# Patient Record
Sex: MALE | Race: WHITE | NOT HISPANIC OR LATINO | Employment: OTHER | URBAN - METROPOLITAN AREA
[De-identification: names, ages, dates, MRNs, and addresses within clinical notes are randomized per-mention and may not be internally consistent; named-entity substitution may affect disease eponyms.]

---

## 2019-10-15 ENCOUNTER — HOSPITAL ENCOUNTER (OUTPATIENT)
Dept: LAB | Facility: MEDICAL CENTER | Age: 74
End: 2019-10-15
Attending: FAMILY MEDICINE
Payer: MEDICARE

## 2024-05-20 ENCOUNTER — HOSPITAL ENCOUNTER (OUTPATIENT)
Dept: RADIOLOGY | Facility: MEDICAL CENTER | Age: 79
End: 2024-05-20
Payer: COMMERCIAL

## 2024-05-20 ENCOUNTER — HOSPITAL ENCOUNTER (INPATIENT)
Facility: MEDICAL CENTER | Age: 79
LOS: 5 days | DRG: 393 | End: 2024-05-25
Attending: HOSPITALIST | Admitting: HOSPITALIST
Payer: COMMERCIAL

## 2024-05-20 DIAGNOSIS — M25.50 ARTHRALGIA OF MULTIPLE JOINTS: ICD-10-CM

## 2024-05-20 DIAGNOSIS — K55.9 ISCHEMIC BOWEL DISEASE (HCC): Primary | ICD-10-CM

## 2024-05-20 DIAGNOSIS — M19.90 ARTHRITIS: ICD-10-CM

## 2024-05-20 DIAGNOSIS — I71.40 ABDOMINAL AORTIC ANEURYSM (AAA) WITHOUT RUPTURE, UNSPECIFIED PART (HCC): ICD-10-CM

## 2024-05-20 PROBLEM — Z71.89 ADVANCED CARE PLANNING/COUNSELING DISCUSSION: Status: ACTIVE | Noted: 2024-05-20

## 2024-05-20 PROBLEM — E43 SEVERE PROTEIN-CALORIE MALNUTRITION (HCC): Status: ACTIVE | Noted: 2024-05-20

## 2024-05-20 PROBLEM — N28.0 RENAL INFARCT (HCC): Status: ACTIVE | Noted: 2024-05-20

## 2024-05-20 PROBLEM — K92.2 GI BLEED: Status: ACTIVE | Noted: 2024-05-20

## 2024-05-20 LAB
ALBUMIN SERPL BCP-MCNC: 3.7 G/DL (ref 3.2–4.9)
ALBUMIN/GLOB SERPL: 1.4 G/DL
ALP SERPL-CCNC: 73 U/L (ref 30–99)
ALT SERPL-CCNC: 9 U/L (ref 2–50)
ANION GAP SERPL CALC-SCNC: 10 MMOL/L (ref 7–16)
AST SERPL-CCNC: 15 U/L (ref 12–45)
BASOPHILS # BLD AUTO: 0.6 % (ref 0–1.8)
BASOPHILS # BLD: 0.08 K/UL (ref 0–0.12)
BILIRUB SERPL-MCNC: 0.7 MG/DL (ref 0.1–1.5)
BUN SERPL-MCNC: 16 MG/DL (ref 8–22)
CALCIUM ALBUM COR SERPL-MCNC: 8.5 MG/DL (ref 8.5–10.5)
CALCIUM SERPL-MCNC: 8.3 MG/DL (ref 8.5–10.5)
CHLORIDE SERPL-SCNC: 109 MMOL/L (ref 96–112)
CO2 SERPL-SCNC: 23 MMOL/L (ref 20–33)
CREAT SERPL-MCNC: 0.87 MG/DL (ref 0.5–1.4)
EOSINOPHIL # BLD AUTO: 0.18 K/UL (ref 0–0.51)
EOSINOPHIL NFR BLD: 1.3 % (ref 0–6.9)
ERYTHROCYTE [DISTWIDTH] IN BLOOD BY AUTOMATED COUNT: 45.5 FL (ref 35.9–50)
EXTRA TUBE BLU BLU: NORMAL
GFR SERPLBLD CREATININE-BSD FMLA CKD-EPI: 88 ML/MIN/1.73 M 2
GLOBULIN SER CALC-MCNC: 2.7 G/DL (ref 1.9–3.5)
GLUCOSE SERPL-MCNC: 87 MG/DL (ref 65–99)
HCT VFR BLD AUTO: 38.2 % (ref 42–52)
HGB BLD-MCNC: 12.9 G/DL (ref 14–18)
IMM GRANULOCYTES # BLD AUTO: 0.05 K/UL (ref 0–0.11)
IMM GRANULOCYTES NFR BLD AUTO: 0.3 % (ref 0–0.9)
LACTATE SERPL-SCNC: 1 MMOL/L (ref 0.5–2)
LYMPHOCYTES # BLD AUTO: 2.11 K/UL (ref 1–4.8)
LYMPHOCYTES NFR BLD: 14.7 % (ref 22–41)
MAGNESIUM SERPL-MCNC: 2 MG/DL (ref 1.5–2.5)
MCH RBC QN AUTO: 31.8 PG (ref 27–33)
MCHC RBC AUTO-ENTMCNC: 33.8 G/DL (ref 32.3–36.5)
MCV RBC AUTO: 94.1 FL (ref 81.4–97.8)
MONOCYTES # BLD AUTO: 1.28 K/UL (ref 0–0.85)
MONOCYTES NFR BLD AUTO: 8.9 % (ref 0–13.4)
NEUTROPHILS # BLD AUTO: 10.63 K/UL (ref 1.82–7.42)
NEUTROPHILS NFR BLD: 74.2 % (ref 44–72)
NRBC # BLD AUTO: 0 K/UL
NRBC BLD-RTO: 0 /100 WBC (ref 0–0.2)
PHOSPHATE SERPL-MCNC: 2.5 MG/DL (ref 2.5–4.5)
PLATELET # BLD AUTO: 198 K/UL (ref 164–446)
PMV BLD AUTO: 11.3 FL (ref 9–12.9)
POTASSIUM SERPL-SCNC: 3.8 MMOL/L (ref 3.6–5.5)
PROT SERPL-MCNC: 6.4 G/DL (ref 6–8.2)
RBC # BLD AUTO: 4.06 M/UL (ref 4.7–6.1)
SODIUM SERPL-SCNC: 142 MMOL/L (ref 135–145)
WBC # BLD AUTO: 14.3 K/UL (ref 4.8–10.8)

## 2024-05-20 PROCEDURE — C9113 INJ PANTOPRAZOLE SODIUM, VIA: HCPCS | Performed by: STUDENT IN AN ORGANIZED HEALTH CARE EDUCATION/TRAINING PROGRAM

## 2024-05-20 PROCEDURE — 700105 HCHG RX REV CODE 258: Performed by: STUDENT IN AN ORGANIZED HEALTH CARE EDUCATION/TRAINING PROGRAM

## 2024-05-20 PROCEDURE — 80053 COMPREHEN METABOLIC PANEL: CPT

## 2024-05-20 PROCEDURE — 84100 ASSAY OF PHOSPHORUS: CPT

## 2024-05-20 PROCEDURE — 36415 COLL VENOUS BLD VENIPUNCTURE: CPT

## 2024-05-20 PROCEDURE — 85025 COMPLETE CBC W/AUTO DIFF WBC: CPT

## 2024-05-20 PROCEDURE — 83605 ASSAY OF LACTIC ACID: CPT

## 2024-05-20 PROCEDURE — 83735 ASSAY OF MAGNESIUM: CPT

## 2024-05-20 PROCEDURE — 99497 ADVNCD CARE PLAN 30 MIN: CPT | Mod: 25 | Performed by: STUDENT IN AN ORGANIZED HEALTH CARE EDUCATION/TRAINING PROGRAM

## 2024-05-20 PROCEDURE — 770020 HCHG ROOM/CARE - TELE (206)

## 2024-05-20 PROCEDURE — 87040 BLOOD CULTURE FOR BACTERIA: CPT | Mod: 91

## 2024-05-20 PROCEDURE — 700111 HCHG RX REV CODE 636 W/ 250 OVERRIDE (IP): Performed by: STUDENT IN AN ORGANIZED HEALTH CARE EDUCATION/TRAINING PROGRAM

## 2024-05-20 PROCEDURE — 99406 BEHAV CHNG SMOKING 3-10 MIN: CPT | Performed by: STUDENT IN AN ORGANIZED HEALTH CARE EDUCATION/TRAINING PROGRAM

## 2024-05-20 PROCEDURE — 99221 1ST HOSP IP/OBS SF/LOW 40: CPT | Performed by: SURGERY

## 2024-05-20 PROCEDURE — 99223 1ST HOSP IP/OBS HIGH 75: CPT | Mod: 25 | Performed by: STUDENT IN AN ORGANIZED HEALTH CARE EDUCATION/TRAINING PROGRAM

## 2024-05-20 RX ORDER — PANTOPRAZOLE SODIUM 40 MG/10ML
40 INJECTION, POWDER, LYOPHILIZED, FOR SOLUTION INTRAVENOUS 2 TIMES DAILY
Status: DISCONTINUED | OUTPATIENT
Start: 2024-05-20 | End: 2024-05-25

## 2024-05-20 RX ORDER — SODIUM CHLORIDE 9 MG/ML
INJECTION, SOLUTION INTRAVENOUS CONTINUOUS
Status: ACTIVE | OUTPATIENT
Start: 2024-05-20 | End: 2024-05-21

## 2024-05-20 RX ORDER — ACETAMINOPHEN 325 MG/1
650 TABLET ORAL EVERY 6 HOURS PRN
Status: DISCONTINUED | OUTPATIENT
Start: 2024-05-20 | End: 2024-05-25 | Stop reason: HOSPADM

## 2024-05-20 RX ADMIN — SODIUM CHLORIDE: 9 INJECTION, SOLUTION INTRAVENOUS at 22:22

## 2024-05-20 RX ADMIN — PIPERACILLIN AND TAZOBACTAM 4.5 G: 4; .5 INJECTION, POWDER, FOR SOLUTION INTRAVENOUS at 22:29

## 2024-05-20 RX ADMIN — PANTOPRAZOLE SODIUM 40 MG: 40 INJECTION, POWDER, FOR SOLUTION INTRAVENOUS at 22:10

## 2024-05-20 SDOH — ECONOMIC STABILITY: TRANSPORTATION INSECURITY
IN THE PAST 12 MONTHS, HAS LACK OF RELIABLE TRANSPORTATION KEPT YOU FROM MEDICAL APPOINTMENTS, MEETINGS, WORK OR FROM GETTING THINGS NEEDED FOR DAILY LIVING?: NO

## 2024-05-20 SDOH — ECONOMIC STABILITY: TRANSPORTATION INSECURITY
IN THE PAST 12 MONTHS, HAS THE LACK OF TRANSPORTATION KEPT YOU FROM MEDICAL APPOINTMENTS OR FROM GETTING MEDICATIONS?: NO

## 2024-05-20 ASSESSMENT — ENCOUNTER SYMPTOMS
HEMOPTYSIS: 0
ORTHOPNEA: 0
NECK PAIN: 0
SHORTNESS OF BREATH: 0
DIZZINESS: 0
CONSTIPATION: 0
VOMITING: 0
BACK PAIN: 0
PALPITATIONS: 0
HEARTBURN: 0
DIARRHEA: 0
SINUS PAIN: 0
DOUBLE VISION: 0
BLOOD IN STOOL: 1
STRIDOR: 0
COUGH: 0
FEVER: 0
NERVOUS/ANXIOUS: 0
SPUTUM PRODUCTION: 0
PHOTOPHOBIA: 0
EYE PAIN: 0
HEADACHES: 0
EYE DISCHARGE: 0
MYALGIAS: 0
HALLUCINATIONS: 0
NAUSEA: 0
BLURRED VISION: 0
CHILLS: 0
ABDOMINAL PAIN: 0

## 2024-05-20 ASSESSMENT — COGNITIVE AND FUNCTIONAL STATUS - GENERAL
DAILY ACTIVITIY SCORE: 24
CLIMB 3 TO 5 STEPS WITH RAILING: A LITTLE
STANDING UP FROM CHAIR USING ARMS: A LITTLE
MOBILITY SCORE: 22
SUGGESTED CMS G CODE MODIFIER DAILY ACTIVITY: CH
SUGGESTED CMS G CODE MODIFIER MOBILITY: CJ

## 2024-05-20 ASSESSMENT — SOCIAL DETERMINANTS OF HEALTH (SDOH)
WITHIN THE PAST 12 MONTHS, THE FOOD YOU BOUGHT JUST DIDN'T LAST AND YOU DIDN'T HAVE MONEY TO GET MORE: NEVER TRUE
WITHIN THE LAST YEAR, HAVE TO BEEN RAPED OR FORCED TO HAVE ANY KIND OF SEXUAL ACTIVITY BY YOUR PARTNER OR EX-PARTNER?: NO
WITHIN THE LAST YEAR, HAVE YOU BEEN AFRAID OF YOUR PARTNER OR EX-PARTNER?: NO
WITHIN THE LAST YEAR, HAVE YOU BEEN HUMILIATED OR EMOTIONALLY ABUSED IN OTHER WAYS BY YOUR PARTNER OR EX-PARTNER?: NO
WITHIN THE LAST YEAR, HAVE YOU BEEN KICKED, HIT, SLAPPED, OR OTHERWISE PHYSICALLY HURT BY YOUR PARTNER OR EX-PARTNER?: NO
IN THE PAST 12 MONTHS, HAS THE ELECTRIC, GAS, OIL, OR WATER COMPANY THREATENED TO SHUT OFF SERVICE IN YOUR HOME?: NO
WITHIN THE PAST 12 MONTHS, YOU WORRIED THAT YOUR FOOD WOULD RUN OUT BEFORE YOU GOT THE MONEY TO BUY MORE: NEVER TRUE

## 2024-05-20 ASSESSMENT — PATIENT HEALTH QUESTIONNAIRE - PHQ9
7. TROUBLE CONCENTRATING ON THINGS, SUCH AS READING THE NEWSPAPER OR WATCHING TELEVISION: NOT AT ALL
9. THOUGHTS THAT YOU WOULD BE BETTER OFF DEAD, OR OF HURTING YOURSELF: NOT AT ALL
1. LITTLE INTEREST OR PLEASURE IN DOING THINGS: NOT AT ALL
5. POOR APPETITE OR OVEREATING: NOT AT ALL
SUM OF ALL RESPONSES TO PHQ9 QUESTIONS 1 AND 2: 1
SUM OF ALL RESPONSES TO PHQ QUESTIONS 1-9: 1
8. MOVING OR SPEAKING SO SLOWLY THAT OTHER PEOPLE COULD HAVE NOTICED. OR THE OPPOSITE, BEING SO FIGETY OR RESTLESS THAT YOU HAVE BEEN MOVING AROUND A LOT MORE THAN USUAL: NOT AT ALL
2. FEELING DOWN, DEPRESSED, IRRITABLE, OR HOPELESS: SEVERAL DAYS
6. FEELING BAD ABOUT YOURSELF - OR THAT YOU ARE A FAILURE OR HAVE LET YOURSELF OR YOUR FAMILY DOWN: NOT AL ALL
3. TROUBLE FALLING OR STAYING ASLEEP OR SLEEPING TOO MUCH: NOT AT ALL
4. FEELING TIRED OR HAVING LITTLE ENERGY: NOT AT ALL

## 2024-05-20 ASSESSMENT — LIFESTYLE VARIABLES
EVER HAD A DRINK FIRST THING IN THE MORNING TO STEADY YOUR NERVES TO GET RID OF A HANGOVER: NO
HOW MANY TIMES IN THE PAST YEAR HAVE YOU HAD 5 OR MORE DRINKS IN A DAY: 0
TOTAL SCORE: 0
TOTAL SCORE: 0
DOES PATIENT WANT TO STOP DRINKING: NO
HAVE PEOPLE ANNOYED YOU BY CRITICIZING YOUR DRINKING: NO
AVERAGE NUMBER OF DAYS PER WEEK YOU HAVE A DRINK CONTAINING ALCOHOL: 0
CONSUMPTION TOTAL: NEGATIVE
SUBSTANCE_ABUSE: 0
ON A TYPICAL DAY WHEN YOU DRINK ALCOHOL HOW MANY DRINKS DO YOU HAVE: 0
TOTAL SCORE: 0
ALCOHOL_USE: NO
HAVE YOU EVER FELT YOU SHOULD CUT DOWN ON YOUR DRINKING: NO
EVER FELT BAD OR GUILTY ABOUT YOUR DRINKING: NO

## 2024-05-20 ASSESSMENT — PAIN DESCRIPTION - PAIN TYPE: TYPE: ACUTE PAIN

## 2024-05-20 ASSESSMENT — FIBROSIS 4 INDEX: FIB4 SCORE: 1.99

## 2024-05-21 ENCOUNTER — APPOINTMENT (OUTPATIENT)
Dept: CARDIOLOGY | Facility: MEDICAL CENTER | Age: 79
DRG: 393 | End: 2024-05-21
Payer: COMMERCIAL

## 2024-05-21 ENCOUNTER — HOSPITAL ENCOUNTER (OUTPATIENT)
Dept: RADIOLOGY | Facility: MEDICAL CENTER | Age: 79
End: 2024-05-21
Attending: STUDENT IN AN ORGANIZED HEALTH CARE EDUCATION/TRAINING PROGRAM

## 2024-05-21 LAB
ALBUMIN SERPL BCP-MCNC: 3.4 G/DL (ref 3.2–4.9)
ALBUMIN/GLOB SERPL: 1.3 G/DL
ALP SERPL-CCNC: 64 U/L (ref 30–99)
ALT SERPL-CCNC: 7 U/L (ref 2–50)
ANION GAP SERPL CALC-SCNC: 10 MMOL/L (ref 7–16)
AST SERPL-CCNC: 14 U/L (ref 12–45)
BILIRUB SERPL-MCNC: 0.7 MG/DL (ref 0.1–1.5)
BUN SERPL-MCNC: 15 MG/DL (ref 8–22)
CALCIUM ALBUM COR SERPL-MCNC: 8.2 MG/DL (ref 8.5–10.5)
CALCIUM SERPL-MCNC: 7.7 MG/DL (ref 8.5–10.5)
CHLORIDE SERPL-SCNC: 112 MMOL/L (ref 96–112)
CHOLEST SERPL-MCNC: 91 MG/DL (ref 100–199)
CO2 SERPL-SCNC: 21 MMOL/L (ref 20–33)
CREAT SERPL-MCNC: 0.69 MG/DL (ref 0.5–1.4)
ERYTHROCYTE [DISTWIDTH] IN BLOOD BY AUTOMATED COUNT: 44.5 FL (ref 35.9–50)
GFR SERPLBLD CREATININE-BSD FMLA CKD-EPI: 94 ML/MIN/1.73 M 2
GLOBULIN SER CALC-MCNC: 2.6 G/DL (ref 1.9–3.5)
GLUCOSE SERPL-MCNC: 114 MG/DL (ref 65–99)
HCT VFR BLD AUTO: 32.2 % (ref 42–52)
HCT VFR BLD AUTO: 35.4 % (ref 42–52)
HCT VFR BLD AUTO: 37.1 % (ref 42–52)
HCT VFR BLD AUTO: 37.8 % (ref 42–52)
HCT VFR BLD AUTO: 38.1 % (ref 42–52)
HDLC SERPL-MCNC: 41 MG/DL
HGB BLD-MCNC: 10.6 G/DL (ref 14–18)
HGB BLD-MCNC: 11.8 G/DL (ref 14–18)
HGB BLD-MCNC: 12 G/DL (ref 14–18)
HGB BLD-MCNC: 12.4 G/DL (ref 14–18)
HGB BLD-MCNC: 12.5 G/DL (ref 14–18)
LACTATE SERPL-SCNC: 0.7 MMOL/L (ref 0.5–2)
LACTATE SERPL-SCNC: 1.1 MMOL/L (ref 0.5–2)
LACTATE SERPL-SCNC: 1.4 MMOL/L (ref 0.5–2)
LDLC SERPL CALC-MCNC: 37 MG/DL
MCH RBC QN AUTO: 31.7 PG (ref 27–33)
MCHC RBC AUTO-ENTMCNC: 33.9 G/DL (ref 32.3–36.5)
MCV RBC AUTO: 93.7 FL (ref 81.4–97.8)
PLATELET # BLD AUTO: 192 K/UL (ref 164–446)
PMV BLD AUTO: 11.5 FL (ref 9–12.9)
POTASSIUM SERPL-SCNC: 3.9 MMOL/L (ref 3.6–5.5)
PROT SERPL-MCNC: 6 G/DL (ref 6–8.2)
RBC # BLD AUTO: 3.78 M/UL (ref 4.7–6.1)
SODIUM SERPL-SCNC: 143 MMOL/L (ref 135–145)
TRIGL SERPL-MCNC: 67 MG/DL (ref 0–149)
WBC # BLD AUTO: 13.5 K/UL (ref 4.8–10.8)

## 2024-05-21 PROCEDURE — 97166 OT EVAL MOD COMPLEX 45 MIN: CPT

## 2024-05-21 PROCEDURE — 36415 COLL VENOUS BLD VENIPUNCTURE: CPT

## 2024-05-21 PROCEDURE — 83605 ASSAY OF LACTIC ACID: CPT | Mod: 91

## 2024-05-21 PROCEDURE — 97161 PT EVAL LOW COMPLEX 20 MIN: CPT

## 2024-05-21 PROCEDURE — C9113 INJ PANTOPRAZOLE SODIUM, VIA: HCPCS | Performed by: STUDENT IN AN ORGANIZED HEALTH CARE EDUCATION/TRAINING PROGRAM

## 2024-05-21 PROCEDURE — A9270 NON-COVERED ITEM OR SERVICE: HCPCS

## 2024-05-21 PROCEDURE — 99231 SBSQ HOSP IP/OBS SF/LOW 25: CPT | Performed by: NURSE PRACTITIONER

## 2024-05-21 PROCEDURE — 700105 HCHG RX REV CODE 258

## 2024-05-21 PROCEDURE — 99222 1ST HOSP IP/OBS MODERATE 55: CPT | Performed by: SURGERY

## 2024-05-21 PROCEDURE — 85014 HEMATOCRIT: CPT | Mod: 91

## 2024-05-21 PROCEDURE — 770020 HCHG ROOM/CARE - TELE (206)

## 2024-05-21 PROCEDURE — 700102 HCHG RX REV CODE 250 W/ 637 OVERRIDE(OP)

## 2024-05-21 PROCEDURE — 700105 HCHG RX REV CODE 258: Performed by: STUDENT IN AN ORGANIZED HEALTH CARE EDUCATION/TRAINING PROGRAM

## 2024-05-21 PROCEDURE — 99222 1ST HOSP IP/OBS MODERATE 55: CPT | Performed by: INTERNAL MEDICINE

## 2024-05-21 PROCEDURE — 80061 LIPID PANEL: CPT

## 2024-05-21 PROCEDURE — 80053 COMPREHEN METABOLIC PANEL: CPT

## 2024-05-21 PROCEDURE — 85027 COMPLETE CBC AUTOMATED: CPT

## 2024-05-21 PROCEDURE — 700111 HCHG RX REV CODE 636 W/ 250 OVERRIDE (IP): Mod: JG

## 2024-05-21 PROCEDURE — 700111 HCHG RX REV CODE 636 W/ 250 OVERRIDE (IP): Mod: JZ | Performed by: STUDENT IN AN ORGANIZED HEALTH CARE EDUCATION/TRAINING PROGRAM

## 2024-05-21 PROCEDURE — 99233 SBSQ HOSP IP/OBS HIGH 50: CPT | Mod: GC | Performed by: INTERNAL MEDICINE

## 2024-05-21 PROCEDURE — 85018 HEMOGLOBIN: CPT | Mod: 91

## 2024-05-21 RX ORDER — METRONIDAZOLE 500 MG/100ML
500 INJECTION, SOLUTION INTRAVENOUS EVERY 12 HOURS
Status: DISCONTINUED | OUTPATIENT
Start: 2024-05-21 | End: 2024-05-22

## 2024-05-21 RX ORDER — LISINOPRIL 40 MG/1
40 TABLET ORAL DAILY
COMMUNITY
Start: 2024-04-22

## 2024-05-21 RX ORDER — AMLODIPINE BESYLATE 5 MG/1
5 TABLET ORAL DAILY
COMMUNITY
Start: 2024-04-22

## 2024-05-21 RX ORDER — DOXEPIN HYDROCHLORIDE 10 MG/1
10 CAPSULE ORAL
Status: DISCONTINUED | OUTPATIENT
Start: 2024-05-21 | End: 2024-05-25 | Stop reason: HOSPADM

## 2024-05-21 RX ORDER — ALENDRONATE SODIUM 70 MG/1
70 TABLET ORAL
Status: DISCONTINUED | OUTPATIENT
Start: 2024-05-28 | End: 2024-05-25 | Stop reason: HOSPADM

## 2024-05-21 RX ORDER — ALBUTEROL SULFATE 90 UG/1
1 INHALANT RESPIRATORY (INHALATION) EVERY 4 HOURS PRN
COMMUNITY
Start: 2024-04-22

## 2024-05-21 RX ORDER — DONEPEZIL HYDROCHLORIDE 5 MG/1
5 TABLET, FILM COATED ORAL NIGHTLY
COMMUNITY
Start: 2024-05-17

## 2024-05-21 RX ORDER — DOXEPIN HYDROCHLORIDE 10 MG/1
10 CAPSULE ORAL
COMMUNITY
Start: 2024-04-22

## 2024-05-21 RX ORDER — UREA 10 %
1000 LOTION (ML) TOPICAL DAILY
Status: DISCONTINUED | OUTPATIENT
Start: 2024-05-21 | End: 2024-05-25 | Stop reason: HOSPADM

## 2024-05-21 RX ORDER — IBUPROFEN 800 MG/1
800 TABLET, FILM COATED ORAL 3 TIMES DAILY PRN
Status: ON HOLD | COMMUNITY
Start: 2024-03-25 | End: 2024-05-25

## 2024-05-21 RX ORDER — ALENDRONATE SODIUM 70 MG/1
70 TABLET ORAL
COMMUNITY
Start: 2024-04-22

## 2024-05-21 RX ORDER — LANOLIN ALCOHOL/MO/W.PET/CERES
1000 CREAM (GRAM) TOPICAL DAILY
COMMUNITY
Start: 2024-05-14

## 2024-05-21 RX ORDER — DONEPEZIL HYDROCHLORIDE 5 MG/1
5 TABLET, FILM COATED ORAL NIGHTLY
Status: DISCONTINUED | OUTPATIENT
Start: 2024-05-21 | End: 2024-05-25 | Stop reason: HOSPADM

## 2024-05-21 RX ORDER — METOPROLOL SUCCINATE 25 MG/1
25 TABLET, EXTENDED RELEASE ORAL DAILY
COMMUNITY
Start: 2024-04-22

## 2024-05-21 RX ORDER — GUAIFENESIN/DEXTROMETHORPHAN 100-10MG/5
5 SYRUP ORAL EVERY 6 HOURS
Status: DISCONTINUED | OUTPATIENT
Start: 2024-05-21 | End: 2024-05-21

## 2024-05-21 RX ADMIN — IOHEXOL 80 ML: 350 INJECTION, SOLUTION INTRAVENOUS at 00:00

## 2024-05-21 RX ADMIN — DOXEPIN HYDROCHLORIDE 10 MG: 10 CAPSULE ORAL at 21:16

## 2024-05-21 RX ADMIN — CEFUROXIME SODIUM 750 MG: 1.5 INJECTION, POWDER, FOR SOLUTION INTRAVENOUS at 21:15

## 2024-05-21 RX ADMIN — PANTOPRAZOLE SODIUM 40 MG: 40 INJECTION, POWDER, FOR SOLUTION INTRAVENOUS at 05:57

## 2024-05-21 RX ADMIN — METRONIDAZOLE 500 MG: 500 INJECTION, SOLUTION INTRAVENOUS at 17:47

## 2024-05-21 RX ADMIN — DONEPEZIL HYDROCHLORIDE 5 MG: 5 TABLET, FILM COATED ORAL at 21:13

## 2024-05-21 RX ADMIN — CEFUROXIME SODIUM 750 MG: 1.5 INJECTION, POWDER, FOR SOLUTION INTRAVENOUS at 13:13

## 2024-05-21 RX ADMIN — PIPERACILLIN AND TAZOBACTAM 4.5 G: 4; .5 INJECTION, POWDER, FOR SOLUTION INTRAVENOUS at 02:19

## 2024-05-21 RX ADMIN — PANTOPRAZOLE SODIUM 40 MG: 40 INJECTION, POWDER, FOR SOLUTION INTRAVENOUS at 17:46

## 2024-05-21 RX ADMIN — CYANOCOBALAMIN TAB 500 MCG 1000 MCG: 500 TAB at 13:13

## 2024-05-21 ASSESSMENT — ENCOUNTER SYMPTOMS
NAUSEA: 0
WEAKNESS: 0
SORE THROAT: 0
HEADACHES: 0
CHILLS: 0
NERVOUS/ANXIOUS: 0
PALPITATIONS: 0
COUGH: 0
BLURRED VISION: 0
VOMITING: 0
FEVER: 0
DIZZINESS: 0
DOUBLE VISION: 0
SPUTUM PRODUCTION: 0
DIARRHEA: 0
BACK PAIN: 0
SHORTNESS OF BREATH: 0
WEIGHT LOSS: 0
MYALGIAS: 0
ABDOMINAL PAIN: 1
CONSTIPATION: 0

## 2024-05-21 ASSESSMENT — COGNITIVE AND FUNCTIONAL STATUS - GENERAL
MOVING TO AND FROM BED TO CHAIR: A LITTLE
STANDING UP FROM CHAIR USING ARMS: A LITTLE
SUGGESTED CMS G CODE MODIFIER MOBILITY: CK
MOBILITY SCORE: 19
CLIMB 3 TO 5 STEPS WITH RAILING: A LITTLE
SUGGESTED CMS G CODE MODIFIER DAILY ACTIVITY: CH
WALKING IN HOSPITAL ROOM: A LITTLE
DAILY ACTIVITIY SCORE: 24
MOVING FROM LYING ON BACK TO SITTING ON SIDE OF FLAT BED: A LITTLE

## 2024-05-21 ASSESSMENT — FIBROSIS 4 INDEX: FIB4 SCORE: 2.18

## 2024-05-21 ASSESSMENT — PAIN DESCRIPTION - PAIN TYPE
TYPE: ACUTE PAIN
TYPE: ACUTE PAIN

## 2024-05-21 ASSESSMENT — LIFESTYLE VARIABLES: SUBSTANCE_ABUSE: 0

## 2024-05-21 ASSESSMENT — ACTIVITIES OF DAILY LIVING (ADL): TOILETING: INDEPENDENT

## 2024-05-21 ASSESSMENT — GAIT ASSESSMENTS
DEVIATION: BRADYKINETIC;DECREASED BASE OF SUPPORT;OTHER (COMMENT)
DISTANCE (FEET): 40
GAIT LEVEL OF ASSIST: STANDBY ASSIST

## 2024-05-21 NOTE — ASSESSMENT & PLAN NOTE
There is a patch uptake of contrast in the posterior aspect of the left kidney extending into the upper pole of the left kidney.  This could be due to ischemia or partial infarction approximately 20% of the left kidney.  Vascular surgery has been consulted.  No indication for any emergent surgical intervention  Further discussion was had with vascular surgeon.  He does not need anticoagulation from the renal infarct perspective.  This all likely secondary to the placement of the stent graft.  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  His SMA appears to be okay.  Recommending management for his GI bleed versus colitis.  Patient will need follow-up with whoever placed the stent graft for surveillance.   He did recommend getting an echocardiogram to rule out thromboembolic status along with the CTA of his chest as well  -Echo pending  -CTA of the chest wall remarkable for 4.1 cm infrarenal abdominal aortic aneurysm with stent graft in place, 4.4 x 4.0 cm fusiform distal thoracic aortic aneurysm, radiographic follow-up and surveillance recommended as clinically appropriate. Emphysema. Cholelithiasis. Atherosclerosis and atherosclerotic coronary artery disease.

## 2024-05-21 NOTE — PROGRESS NOTES
Received report from RN at [facility]. Patient arrived via EMS at 1910 and assumed care at this time. Patient is SBA, steady transferring on his own from the gurney to the bed. Patient was put on tele box, vitals were done, 4 eyes skin check complete. Patient states his pain is 0/10. He is resting comfortably in bed, call light within reach, went over fall and safety precautions, oriented to call light, personal belongings within reach.

## 2024-05-21 NOTE — PROGRESS NOTES
Patient to CT, Tele box on.    1206: patient back from CT, tele box on, patient resting comfortably in bed

## 2024-05-21 NOTE — ASSESSMENT & PLAN NOTE
Patient smokes < 1PPD.  Nicotine patch and/or gum offered.   I discussed cessation with patient including starting on nicotine patch and/or gum on discharge.  I also discussed medications to help with cessation with patient including Wellbutrin and Chantix, offered .  Smoking cessation discussed with patient for 5 minutes.

## 2024-05-21 NOTE — ASSESSMENT & PLAN NOTE
Patient states he is happy  Blood per rectum for last 2 to 3 days.'s likely secondary to the colitis versus proctitis seen on CT imaging  -Pantoprazole 40 mg IV twice daily  -Hemoglobin hematocrit every 4 hours  -Transfuse for hemoglobin less than 7  -GI consulted: Order is not stool culture, C. difficile and start full liquid diet. If stool studies are negative consider flexible sigmoidoscopy for biopsies versus full colonoscopy based on patient's clinical course.

## 2024-05-21 NOTE — PROGRESS NOTES
"  DATE: 5/21/2024    Hospital Day 1  Colitis .    INTERVAL EVENTS:  WBC 13.5 (14.3)  Zinacef and Flagyl   Adequate pain control   GI/vascular and internal med notes reviewed     PHYSICAL EXAMINATION:  Vital Signs: /59   Pulse 80   Temp 37.1 °C (98.8 °F) (Temporal)   Resp 16   Ht 1.676 m (5' 6\")   Wt 44.7 kg (98 lb 8.7 oz)   SpO2 97%     Physical Exam  Vitals and nursing note reviewed.   Constitutional:       General: He is not in acute distress.     Comments: Disheveled, elderly    HENT:      Head: Normocephalic.      Mouth/Throat:      Comments: Edentulous   Pulmonary:      Effort: Pulmonary effort is normal. No respiratory distress.   Chest:      Chest wall: No tenderness.   Abdominal:      General: There is no distension.      Palpations: Abdomen is soft.      Tenderness: There is no abdominal tenderness.   Skin:     General: Skin is warm and dry.      Capillary Refill: Capillary refill takes less than 2 seconds.   Neurological:      Mental Status: He is alert. Mental status is at baseline.   Psychiatric:         Mood and Affect: Mood normal.         Behavior: Behavior normal.         Thought Content: Thought content normal.       ASSESSMENT AND PLAN:  Upgrade to full liquids  No surgical plans at this time  Surgery will follow        ____________________________________     TOM Meza.    DD: 5/21/2024  2:01 PM    "

## 2024-05-21 NOTE — CONSULTS
DATE OF CONSULTATION:  5/20/2024     REFERRING PHYSICIAN:   Mario Frost M.D. ,      CONSULTING PHYSICIAN:  Goldy Malcolm M.D.     REASON FOR CONSULTATION:  I have been asked by Dr. Mario Frost M.D.  to see the patient in surgical consultation for evaluation of colitis.    HISTORY OF PRESENT ILLNESS: Indio Cohen is a very pleasant 78-year-old male receiving care medicine service.  Transfer from outside facility.  Evaluated outside hospital bright red blood per rectum report abdominal pain.  He received evaluation including imaging concerning for colitis, concern for ischemia left kidney.   Indio Cohen is awake alert and appropriate.  He is in good spirits and family.  He states no abdominal pain hungry.  He states no problems urinating.  He states no fever chills nausea vomiting.  He reports bloody bowel months past 2 to 3 days.  He reports prior abdominal aortic aneurysm repair uncertain dates.       PAST MEDICAL HISTORY:  has a past medical history of AAA (abdominal aortic aneurysm) (HCC), Arthritis, Hiatus hernia syndrome (2000), Nausea & vomiting, and Pain (06-11-13).    PAST SURGICAL HISTORY:  has a past surgical history that includes hiatal hernia repair (2000) and aaa with stent graft (6/19/2013).    ALLERGIES:   Allergies   Allergen Reactions    Codeine Vomiting     n/v       CURRENT MEDICATIONS:    Home Medications    **Home medications have not yet been reviewed for this encounter**         FAMILY HISTORY: family history includes Heart Disease in his father and mother.    SOCIAL HISTORY:  reports that he has been smoking cigarettes. He has never used smokeless tobacco. He reports current alcohol use. He reports that he does not use drugs.    REVIEW OF SYSTEMS: Comprehensive review of systems is negative with the exception of the aforementioned HPI, PMH, and PSH bullets in accordance with CMS guidelines.    PHYSICAL EXAMINATION:    Physical Exam  BP (!) 144/72 Comment: rn notified   "Pulse 64   Temp 36.8 °C (98.2 °F) (Temporal)   Resp 16   Ht 1.676 m (5' 6\")   Wt 44.4 kg (97 lb 14.2 oz)   BMI 15.80 kg/m²    Awake alert appropriate  Friendly cooperative  No distress breathing with ease  Brisk capillary refill palpable pulse  Abdomen soft nontender nondistended no guarding no rebound  Skin warm appropriate color and temperature  LABORATORY VALUES:   Recent Labs     05/20/24 2147   WBC 14.3*   RBC 4.06*   HEMOGLOBIN 12.9*   HEMATOCRIT 38.2*   MCV 94.1   MCH 31.8   MCHC 33.8   RDW 45.5   PLATELETCT 198   MPV 11.3     Recent Labs     05/20/24 2147   SODIUM 142   POTASSIUM 3.8   CHLORIDE 109   CO2 23   GLUCOSE 87   BUN 16   CREATININE 0.87   CALCIUM 8.3*     Recent Labs     05/20/24 2147   ASTSGOT 15   ALTSGPT 9   TBILIRUBIN 0.7   ALKPHOSPHAT 73   GLOBULIN 2.7            IMAGING:   OUTSIDE IMAGES-CT CHEST   Final Result      OUTSIDE IMAGES-CT ABDOMEN /PELVIS   Final Result      CT-CTA COMPLETE THORACOABDOMINAL AORTA    (Results Pending)   EC-ECHOCARDIOGRAM COMPLETE W/O CONT    (Results Pending)       ASSESSMENT AND PLAN:   Transfer outside hospital  GI bleed  CT scan concerning for colitis, possible infarct kidney  Currently pain-free  No indication for emergency abdominal surgery   Monitor abdominal exam  Medicine teams discussed with vascular follow-up vascular evaluation recommendations  Follow-up evaluation GI recommendations GI bleed  Monitor labs transfuse as needed  Close monitoring and support    Discussed findings and plan with patient  Discussed with medicine service    [facility] will follow     ____________________________________     Goldy Malcolm M.D.    DD: 5/20/2024  10:26 PM    AAST Grading System for EGS Conditions  ACS NSQIP Surgical Risk Calculator   "

## 2024-05-21 NOTE — H&P
Hospital Medicine History & Physical Note    Date of Service  5/20/2024    Primary Care Physician  Klaudia Arana M.D.    Consultants  general surgery and vascular surgery    Specialist Names: Dr. Malcolm and Dr. Keller     Code Status  Full Code    Chief Complaint  No chief complaint on file.      History of Presenting Illness  Indio Cohen is a 78 y.o. male who presented 5/20/2024 as a transfer from outside facility.  Patient initially presented to outside facility complaining of abdominal pain and bright red blood per rectum.  States that the pain and bloody bowel movements have been going on for 2 to 3 days now.  Denies any fevers, chills, nausea, vomiting.  He states that the pain is worse in the left upper quadrant.    CT angiography of the abdomen pelvis with IV contrast was obtained.  There is severe wall thickening the sigmoid colon and rectum consistent with colitis and proctitis.  This could be due to infectious versus inflammatory versus ischemic etiology.  Inferior mesenteric artery is not clearly seen.  There is a 4.1 cm abdominal aortic aneurysm with stent graft present that appears to be stable compared to December 2023.  There is a patch uptake of contrast in the posterior aspect of the left kidney extending into the upper pole of the left kidney.  This could be due to ischemia or partial infarction approximately 20% of the left kidney.    CT scan of the chest shows moderate emphysematous changes.  Coronary artery disease.  There is moderate size hiatal hernia.  4.8 mm nodule at the left lung base.    Labs are notable for leukocytosis greater than 17,000.  Hemoglobin appears to be stable at 14.  He had a creatinine level of 1.32, which appears to be near his baseline.  Lactic acid was 1.3.  Urinalysis unremarkable.    Case is been discussed with both general surgery and vascular surgery.  No negation for surgical intervention at this time.      I discussed the plan of care with  patient.    Review of Systems  Review of Systems   Constitutional:  Negative for chills and fever.   HENT:  Negative for congestion, ear discharge, ear pain, nosebleeds, sinus pain and tinnitus.    Eyes:  Negative for blurred vision, double vision, photophobia, pain and discharge.   Respiratory:  Negative for cough, hemoptysis, sputum production, shortness of breath and stridor.    Cardiovascular:  Negative for chest pain, palpitations and orthopnea.   Gastrointestinal:  Positive for blood in stool. Negative for abdominal pain, constipation, diarrhea, heartburn, melena, nausea and vomiting.   Genitourinary:  Negative for dysuria, frequency, hematuria and urgency.   Musculoskeletal:  Negative for back pain, joint pain, myalgias and neck pain.   Skin:  Negative for itching and rash.   Neurological:  Negative for dizziness and headaches.   Psychiatric/Behavioral:  Negative for hallucinations, substance abuse and suicidal ideas. The patient is not nervous/anxious.        Past Medical History   has a past medical history of AAA (abdominal aortic aneurysm) (HCC), Arthritis, Hiatus hernia syndrome (2000), Nausea & vomiting, and Pain (06-11-13).    Surgical History   has a past surgical history that includes hiatal hernia repair (2000) and aaa with stent graft (6/19/2013).     Family History  family history includes Heart Disease in his father and mother.   Family history reviewed with patient. There is no family history that is pertinent to the chief complaint.     Social History   reports that he has been smoking cigarettes. He has never used smokeless tobacco. He reports current alcohol use. He reports that he does not use drugs.    Allergies  Allergies   Allergen Reactions    Codeine Vomiting     n/v       Medications  Prior to Admission Medications   Prescriptions Last Dose Informant Patient Reported? Taking?   hydrocodone/acetaminophen (NORCO)  MG TABS  Significant Other Yes No   Sig: Take 1-2 Tabs by mouth 3  times a day. Indications: Moderate to Moderately Severe Pain   omeprazole (PRILOSEC) 20 MG CPDR  Significant Other Yes No   Sig: Take 20 mg by mouth 2 times a day. Indications: Gastroesophageal Reflux Disease with Current Symptoms   tramadol (ULTRAM) 50 MG TABS  Significant Other Yes No   Sig: Take  mg by mouth 3 times a day. Indications: Moderate to Moderately Severe Pain      Facility-Administered Medications: None       Physical Exam  Temp:  [36.8 °C (98.2 °F)] 36.8 °C (98.2 °F)  Pulse:  [64] 64  Resp:  [16] 16  BP: (144)/(72) 144/72  Blood Pressure : (!) 144/72 (rn notified)   Temperature: 36.8 °C (98.2 °F)   Pulse: 64   Respiration: 16           Physical Exam  Constitutional:       General: He is not in acute distress.     Appearance: Normal appearance. He is normal weight. He is not ill-appearing, toxic-appearing or diaphoretic.   HENT:      Head: Normocephalic and atraumatic.      Mouth/Throat:      Mouth: Mucous membranes are moist.   Eyes:      Pupils: Pupils are equal, round, and reactive to light.   Cardiovascular:      Rate and Rhythm: Normal rate and regular rhythm.      Pulses: Normal pulses.      Heart sounds: Normal heart sounds. No murmur heard.     No friction rub. No gallop.   Pulmonary:      Effort: Pulmonary effort is normal. No respiratory distress.      Breath sounds: Normal breath sounds. No stridor. No wheezing, rhonchi or rales.   Chest:      Chest wall: No tenderness.   Abdominal:      General: There is no distension.      Palpations: There is no mass.      Tenderness: There is no abdominal tenderness. There is no right CVA tenderness, left CVA tenderness, guarding or rebound.      Hernia: No hernia is present.   Musculoskeletal:         General: No swelling, tenderness, deformity or signs of injury.      Right lower leg: No edema.      Left lower leg: No edema.   Skin:     General: Skin is warm and dry.      Capillary Refill: Capillary refill takes less than 2 seconds.       "Coloration: Skin is not jaundiced or pale.      Findings: No bruising, erythema, lesion or rash.   Neurological:      General: No focal deficit present.      Mental Status: He is alert and oriented to person, place, and time. Mental status is at baseline.      Cranial Nerves: No cranial nerve deficit.      Sensory: No sensory deficit.      Motor: No weakness.      Coordination: Coordination normal.      Gait: Gait normal.      Deep Tendon Reflexes: Reflexes normal.   Psychiatric:         Mood and Affect: Mood normal.         Laboratory:  Recent Labs     05/20/24  2147   WBC 14.3*   RBC 4.06*   HEMOGLOBIN 12.9*   HEMATOCRIT 38.2*   MCV 94.1   MCH 31.8   MCHC 33.8   RDW 45.5   PLATELETCT 198   MPV 11.3         No results for input(s): \"ALTSGPT\", \"ASTSGOT\", \"ALKPHOSPHAT\", \"TBILIRUBIN\", \"DBILIRUBIN\", \"GAMMAGT\", \"AMYLASE\", \"LIPASE\", \"ALB\", \"PREALBUMIN\", \"GLUCOSE\" in the last 72 hours.      No results for input(s): \"NTPROBNP\" in the last 72 hours.      No results for input(s): \"TROPONINT\" in the last 72 hours.    Imaging:  OUTSIDE IMAGES-CT CHEST   Final Result      OUTSIDE IMAGES-CT ABDOMEN /PELVIS   Final Result      CT-CTA COMPLETE THORACOABDOMINAL AORTA    (Results Pending)   EC-ECHOCARDIOGRAM COMPLETE W/O CONT    (Results Pending)       X-Ray:  I have personally reviewed the images and compared with prior images.  EKG:  I have personally reviewed the images and compared with prior images.    Assessment/Plan:  Justification for Admission Status  I anticipate this patient will require at least two midnights for appropriate medical management, necessitating inpatient admission because bowel ischemia versus infection and possible renal infarct see above    Patient will need a Telemetry bed on MEDICAL service .  The need is secondary to see above.    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  There is severe wall thickening the sigmoid colon and rectum consistent with colitis and proctitis.  This could be due to infectious versus inflammatory versus ischemic etiology.  Inferior mesenteric artery is not clearly seen.    Pantoprazole 40mg IV BID  H/H [id number]   Transfuse for hemoglobin less than 7  Empirically started the patient on IV Zosyn   General Surgery consulted  No surgical intervention at this point     Advanced care planning/counseling discussion  Assessment & Plan  I spent 17 minutes at bedside with nursing staff present with patient discussing work-up, results, diagnosis, prognosis. Talked to patient about his COPD, severe protein calorie malnutrition, hx of AAA, now presenting with ischemic bowel/infectious and a renal infarct    CODE STATUS discussed with patient and wants to be Full Code       Renal infarct (HCC)  Assessment & Plan  There is a patch uptake of contrast in the posterior aspect of the left kidney extending into the upper pole of the left kidney.  This could be due to ischemia or partial infarction approximately 20% of the left kidney.  Vascular surgery has been consulted.  No indication for any emergent surgical intervention  Further discussion was had with vascular surgeon.  He does not need anticoagulation from the renal infarct perspective.  This all likely secondary to the placement of the stent graft.  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  His SMA appears to be okay.  Recommending management for his GI bleed versus colitis.  Patient will need follow-up with whoever placed the stent graft for surveillance.   He did recommend getting an echocardiogram to rule out thromboembolic status along with the CTA of his chest as well       GI bleed  Assessment & Plan  Patient states he is happy  Blood per rectum for last 2 to 3 days.'s likely secondary to the colitis versus proctitis seen on CT  imaging  Pantoprazole 40 mg IV twice daily  Hemoglobin hematocrit every 4 hours  Transfuse for hemoglobin less than 7  GI consult in the morning    Severe protein-calorie malnutrition (HCC)  Assessment & Plan  Nutrition consult placed    Smoking- (present on admission)  Assessment & Plan  Patient smokes < 1PPD.  Nicotine patch and/or gum offered.   I discussed cessation with patient including starting on nicotine patch and/or gum on discharge.  I also discussed medications to help with cessation with patient including Wellbutrin and Chantix, offered .  Smoking cessation discussed with patient for 4 minutes.      AAA (abdominal aortic aneurysm) (HCC)- (present on admission)  Assessment & Plan  4.1cm AAA seen on CTA.   Graft in place   Appears to be stable         VTE prophylaxis: SCDs/TEDs

## 2024-05-21 NOTE — THERAPY
Physical Therapy   Initial Evaluation     Patient Name: Indio Cohen  Age:  78 y.o., Sex:  male  Medical Record #: [id number]  Today's Date: 5/21/2024     Precautions  Precautions: Fall Risk;Other (See Comments)  Comments: Special contact isolation for R/O C diff placed after PT session    Assessment  Patient is 78 y.o. male who presented 5/20/2024 as a transfer from outside facility, complaining of abdominal pain and bright red blood per rectum.     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 Patient in bed, agreeable for the session. Able to demonstrate functional mobility tasks as detailed below. Will continue to benefit from PT services to help improve overall functional mobility. Recommend  PT at this time due to H/O several falls at home.     Plan    Physical Therapy Initial Treatment Plan   Treatment Plan : Bed Mobility, Equipment, Family / Caregiver Training, Gait Training, Stair Training, Therapeutic Activities  Treatment Frequency: 3 Times per Week  Duration: Until Therapy Goals Met    DC Equipment Recommendations: Unable to determine at this time  Discharge Recommendations: Recommend home health for continued physical therapy services (Due to H/O several falls)     Objective     05/21/24 0942   Initial Contact Note    Initial Contact Note Order Received and Verified, Physical Therapy Evaluation in Progress with Full Report to Follow.   Precautions   Precautions Fall Risk;Other (See Comments)   Comments Special contact isolation for R/O C diff placed after PT session   Vitals   Pulse 74   Patient BP Position Sitting  (EOB)   Blood Pressure  103/53   Pulse Oximetry 95 %   O2 Delivery Device None - Room Air   Pain   Pain Scales 0 to 10 Scale    Intervention Repositioned;Rest   Pain 0 - 10 Group   Location Abdomen   Therapist Pain  Assessment During Activity  (Patient reported pain in abdomen when performing supine-sidelying-sit)   Prior Living Situation   Prior Services Home-Independent   Housing / Facility 1 Story House   Steps Into Home 4   Steps In Home 0   Rail None   Equipment Owned Crutches;Single Point Cane   Lives with - Patient's Self Care Capacity Adult Children  (Son)   Comments Patient mentioned that his son-TERESA is home most of the time   Prior Level of Functional Mobility   Bed Mobility Independent   Transfer Status Independent   Ambulation Independent   Ambulation Distance Household   Assistive Devices Used None   Stairs Independent   History of Falls   History of Falls Yes   Date of Last Fall   (Per patient he has frequent falls, reports about a dozen falls in the last 1 year. He mostly falls due to loss of balance.)   Cognition    Cognition / Consciousness X   Speech/ Communication Hard of Hearing   Level of Consciousness Alert   Passive ROM Lower Body   Passive ROM Lower Body WDL   Active ROM Lower Body    Active ROM Lower Body  WDL   Strength Lower Body   Comments Grossly BLE 4/5   Other Treatments   Other Treatments Provided Patient was educated about importance of daily mobility, OOB to chair for meals and ambulate multiple times during the day with supervision from nursing as able.   Balance Assessment   Sitting Balance (Static) Good   Sitting Balance (Dynamic) Fair +   Standing Balance (Static) Fair   Standing Balance (Dynamic) Fair -   Weight Shift Sitting Good   Weight Shift Standing Good   Comments Seated EOB; Standing W/O AD   Bed Mobility    Supine to Sit Contact Guard Assist   Sit to Supine Standby Assist   Scooting Supervised   Rolling Supervised  (Roll to R)   Comments HOB flat, without use of rails, towards R side of the bed; increased time & effort   Gait Analysis   Gait Level Of Assist Standby Assist   Assistive Device None   Distance (Feet) 40   Deviation Bradykinetic;Decreased Base Of Support;Other  (Comment)  (Decreased step & stride length)   Comments Cues for directions   Functional Mobility   Sit to Stand Standby Assist   Bed, Chair, Wheelchair Transfer Refused   Mobility Bed-EOB-sit-stand EOB-ambulate in the room & little into the [id number]   Comments Cues for hand placement, LE placement prior to sit-stand, stand-sit   6 Clicks Assessment - How much HELP from from another person do you currently need... (If the patient hasn't done an activity recently, how much help from another person do you think he/she would need if he/she tried?)   Turning from your back to your side while in a flat bed without using bedrails? 4   Moving from lying on your back to sitting on the side of a flat bed without using bedrails? 3   Moving to and from a bed to a chair (including a wheelchair)? 3   Standing up from a chair using your arms (e.g., wheelchair, or bedside chair)? 3   Walking in hospital room? 3   Climbing 3-5 steps with a railing? 3   6 clicks Mobility Score 19   Patient / Family Goals    Patient / Family Goal #1 To return home   Short Term Goals    Short Term Goal # 1 Patient will perform supine-sit, sit-supine with HOB flat, without rails independently in 6 visits to be able to get in & out of bed safely   Short Term Goal # 2 Patient will perform chair transfers with LRAD with supervision in 6 visits to safely get OOB to chair   Short Term Goal # 3 Patient will ambulate 200 feet with LRAD with supervision in 6 visits to safely ambulate household and limited community distance   Short Term Goal # 4 Patient will negotiate 4 steps without rails with supervision with LRAD in 6 visits to safely get in & out of his home   Education Group   Education Provided Role of Physical Therapist   Role of Physical Therapist Patient Response Patient;Acceptance;Explanation;Verbal Demonstration   Physical Therapy Initial Treatment Plan    Treatment Plan  Bed Mobility;Equipment;Family / Caregiver Training;Gait  Training;Stair Training;Therapeutic Activities   Treatment Frequency 3 Times per Week   Duration Until Therapy Goals Met   Problem List    Problems Impaired Bed Mobility;Impaired Transfers;Impaired Ambulation;Decreased Activity Tolerance   Anticipated Discharge Equipment and Recommendations   DC Equipment Recommendations Unable to determine at this time   Discharge Recommendations Recommend home health for continued physical therapy services  (Due to H/O several falls)   Interdisciplinary Plan of Care Collaboration   IDT Collaboration with  Nursing   Patient Position at End of Therapy In Bed;Bed Alarm On;Call Light within Reach;Tray Table within Reach   Session Information   Date / Session Number  5/21-1(1/3, 5/27)

## 2024-05-21 NOTE — PROGRESS NOTES
4 Eyes Skin Assessment Completed by OMARI Romero and OMARI Nathan.    Head WDL  Ears Redness  Nose WDL  Mouth WDL  Neck WDL  Breast/Chest WDL  Shoulder Blades WDL  Spine WDL  (R) Arm/Elbow/Hand Redness and Blanching, scattered bruising  (L) Arm/Elbow/Hand scattered bruising   Abdomen WDL  Groin WDL  Scrotum/Coccyx/Buttocks WDL  (R) Leg Abrasion, scabbed abrasion right knee  (L) Leg WDL  (R) Heel/Foot/Toe WDL, dryness  (L) Heel/Foot/Toe WDL, dryness          Devices In Places Tele Box, Blood Pressure Cuff, and Pulse Ox      Interventions In Place Pillows    Possible Skin Injury No    Pictures Uploaded Into Epic N/A  Wound Consult Placed N/A  RN Wound Prevention Protocol Ordered No

## 2024-05-21 NOTE — ASSESSMENT & PLAN NOTE
History of 4.1cm AAA seen on CTA.   -Graft in place   -Appears to be stable   -No hemodynamically instability

## 2024-05-21 NOTE — CONSULTS
VASCULAR SURGERY CONSULTATION      DATE OF CONSULTATION: 5/21/2024     REFERRING PHYSICIAN: Mario Frost MD     CONSULTING PHYSICIAN: Elpidio Keller M.D.  REASON FOR CONSULTATION: Evaluate patient with possible renal infarct    HISTORY OF PRESENT ILLNESS: The patient is a 78 y.o. .  Gentleman who back in 2013 he underwent endovascular repair of abdominal aortic aneurysm with suprarenal fixation.  This was performed by Dr. Thee Sifuentes.  The patient has not been in surveillance.  He presented to [facility] with a lower GI bleed bright red bright red blood per rectum and was being evaluated.  During the course of that evaluation a CT scan was performed of his chest abdomen and pelvis.  CT scan demonstrated a degenerated aorta with the stent graft in place.  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  The patient has atherosclerotic disease of the aorta as well.  Having said that the patient has good flow through the superior mesenteric artery there is some calcific disease present but there is adequate luminal diameter.  With respect to the patient's kidney there is a small area of infarct age undetermined.  This is may very well if occurred at the time of the aortic stent graft placement as he does have considerable mural thrombus within the perivisceral aorta.  At any rate I think it is likely related to the mural thrombus within the aneurysm and no anticoagulation is recommended.  The patient is a very poor historian he is lying under a cover and refusing to show his face or speak.      PAST MEDICAL HISTORY:  has a past medical history of AAA (abdominal aortic aneurysm) (HCC), Arthritis, Hiatus hernia syndrome (2000), Nausea & vomiting, and Pain (06-11-13).     PAST SURGICAL HISTORY:  has a past surgical history that includes hiatal hernia repair (2000) and aaa with stent graft (6/19/2013).     ALLERGIES:   Allergies   Allergen Reactions    Codeine Vomiting  "    n/v        CURRENT MEDICATIONS:   Home Medications       Reviewed by Heather Ramso R.N. (Registered Nurse) on 05/21/24 at 0359  Med List Status: Complete     Medication Last Dose Status   hydrocodone/acetaminophen (NORCO)  MG TABS  Active   omeprazole (PRILOSEC) 20 MG CPDR  Active   tramadol (ULTRAM) 50 MG TABS  Active                    FAMILY HISTORY:   Family History   Problem Relation Age of Onset    Heart Disease Mother     Heart Disease Father        History reviewed. No pertinent family history.       SOCIAL HISTORY:   Social History     Tobacco Use    Smoking status: Every Day     Current packs/day: 1.00     Types: Cigarettes    Smokeless tobacco: Never   Substance and Sexual Activity    Alcohol use: Yes     Comment: \"occasional beer\"    Drug use: No    Sexual activity: Not on file       ROS   Unobtainable due to patient's lack of participation  All other systems were reviewed and are negative (AMA/CMS criteria)                Physical Exam    Unobtainable due to the patient's lack of participation    LABORATORY VALUES:   Recent Labs     05/20/24 2147 05/21/24 0211 05/21/24  0641   WBC 14.3* 13.5*  --    RBC 4.06* 3.78*  --    HEMOGLOBIN 12.9* 12.0* 12.5*   HEMATOCRIT 38.2* 35.4* 38.1*   MCV 94.1 93.7  --    MCH 31.8 31.7  --    MCHC 33.8 33.9  --    RDW 45.5 44.5  --    PLATELETCT 198 192  --    MPV 11.3 11.5  --      Recent Labs     05/20/24 2147 05/21/24  0211   SODIUM 142 143   POTASSIUM 3.8 3.9   CHLORIDE 109 112   CO2 23 21   GLUCOSE 87 114*   BUN 16 15   CREATININE 0.87 0.69   CALCIUM 8.3* 7.7*     Recent Labs     05/20/24 2147 05/21/24  0211   ASTSGOT 15 14   ALTSGPT 9 7   TBILIRUBIN 0.7 0.7   ALKPHOSPHAT 73 64   GLOBULIN 2.7 2.6            IMAGING:   CT-CTA COMPLETE THORACOABDOMINAL AORTA   Final Result         1.  4.1 cm infrarenal abdominal aortic aneurysm with stent graft in place.   2.  4.4 x 4.0 cm fusiform distal thoracic aortic aneurysm, radiographic follow-up and " surveillance recommended as clinically appropriate.   3.  Emphysema   4.  Cholelithiasis   5.  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  I also believe that the renal infarct is of no concern.  The etiology is likely from the mural thrombus within the degenerated perivisceral aorta either at the time of stent graft insertion or other.  I do not recommend systemic anticoagulation in this patient for that indication.  Will defer GI bleed workup to other specialist  I do not suspect mesenteric ischemia as the patient has good luminal diameter through the superior mesenteric artery and no intervention is warranted.    Patient should be in some type of surveillance program as he does have the stent graft in place and I will arrange follow-up with the [facility] surgeons.    Call if needed    ____________________________________   Elpidio Keller M.D.          DD: 5/21/2024   DT: 8:57 AM

## 2024-05-21 NOTE — PROGRESS NOTES
Med rec completed per patient's pharmacy and patient's son at bedside.   No noted anticoagulants, antiplatelets, antibiotics.

## 2024-05-21 NOTE — CONSULTS
"Date of Consultation:  5/21/2024    Patient: : Indio Cohen  MRN: [id number]    Referring Physician:  Dr Edmonds     GI:Ben Mendosa M.D.     Reason for Consultation: Colitis, hematochezia    History of Present Illness:   78-year-old male transferred for abdominal pain and hematochezia.  Patient has been having this process going on for 3 days.  No bloody bowel movements overnight.  No fevers.  No sick contacts.  This is never happened before.  Patient has CT scan demonstrating colitis in the rectosigmoid region.  Patient denies history of colonoscopy.  No new medications.  Patient has not been on antibiotics recently.  Patient denies history of colonoscopy.  Patient has a history of AAA repair.  He has an endograft in place which appears stable.  Patient denies unintentional weight loss.  No hematemesis.  He is nauseous and has problems with vomiting.      Past Medical History:   Diagnosis Date    Pain 06-11-13    generalized abd, 2-3/10    Hiatus hernia syndrome 2000    repaired    AAA (abdominal aortic aneurysm) (HCC)     Arthritis     \"everywhere\"    Nausea & vomiting        Past Surgical History:   Procedure Laterality Date    AAA WITH STENT GRAFT  6/19/2013    Performed by Mark Sifuentes M.D. at SURGERY [facility]    HIATAL HERNIA REPAIR  2000       Family History   Problem Relation Age of Onset    Heart Disease Mother     Heart Disease Father        Social History     Socioeconomic History    Marital status:    Tobacco Use    Smoking status: Every Day     Current packs/day: 1.00     Types: Cigarettes    Smokeless tobacco: Never   Substance and Sexual Activity    Alcohol use: Yes     Comment: \"occasional beer\"    Drug use: No     Social Determinants of Health     Food Insecurity: No Food Insecurity (5/20/2024)    Hunger Vital Sign     Worried About Running Out of Food in the Last Year: Never true     Ran Out of Food in the Last Year: Never true   Transportation Needs: No Transportation Needs " (5/20/2024)    PRAPARE - Transportation     Lack of Transportation (Medical): No     Lack of Transportation (Non-Medical): No   Intimate Partner Violence: Not At Risk (5/20/2024)    Humiliation, Afraid, Rape, and Kick questionnaire     Fear of Current or Ex-Partner: No     Emotionally Abused: No     Physically Abused: No     Sexually Abused: No   Housing Stability: Unknown (5/20/2024)    Housing Stability Vital Sign     Unable to Pay for Housing in the Last Year: No     Unstable Housing in the Last Year: No       Current Meds (name, sig, last dose):     Current Facility-Administered Medications:     Special Contact Isolation **AND** C Diff by PCR rflx Toxin **AND** Pharmacy    pantoprazole    acetaminophen    [COMPLETED] piperacillin-tazobactam **AND** piperacillin-tazobactam      ROS  10 systems reviewed and are negative unless otherwise noted in history of present illness.    Physical Exam:  Vitals:    05/21/24 0600 05/21/24 0700 05/21/24 0743 05/21/24 0800   BP:   118/53    Pulse: 76 66 64 65   Resp:   16    Temp:   37 °C (98.6 °F)    TempSrc:   Temporal    SpO2: 96% 94% 96% 95%   Weight:       Height:           Physical Exam  Vitals and nursing note reviewed.   Constitutional:       General: He is not in acute distress.     Appearance: He is not toxic-appearing.   HENT:      Head: Normocephalic and atraumatic.      Mouth/Throat:      Mouth: Mucous membranes are moist.   Eyes:      General: No scleral icterus.  Cardiovascular:      Rate and Rhythm: Normal rate and regular rhythm.   Abdominal:      General: There is no distension.      Palpations: Abdomen is soft.      Tenderness: There is no abdominal tenderness. There is no guarding or rebound.   Skin:     General: Skin is warm and dry.   Neurological:      General: No focal deficit present.      Mental Status: He is alert and oriented to person, place, and time. Mental status is at baseline.   Psychiatric:         Behavior: Behavior normal.         Judgment:  Judgment normal.           Labs:  Recent Labs     05/20/24 2147 05/21/24 0211   SODIUM 142 143   POTASSIUM 3.8 3.9   CHLORIDE 109 112   CO2 23 21   BUN 16 15   CREATININE 0.87 0.69   MAGNESIUM 2.0  --    PHOSPHORUS 2.5  --    CALCIUM 8.3* 7.7*     Recent Labs     05/20/24 2147 05/21/24 0211   ALTSGPT 9 7   ASTSGOT 15 14   ALKPHOSPHAT 73 64   TBILIRUBIN 0.7 0.7   GLUCOSE 87 114*     Recent Labs     05/20/24 2147 05/21/24 0211   WBC 14.3* 13.5*   NEUTSPOLYS 74.20*  --    LYMPHOCYTES 14.70*  --    MONOCYTES 8.90  --    EOSINOPHILS 1.30  --    BASOPHILS 0.60  --    ASTSGOT 15 14   ALTSGPT 9 7   ALKPHOSPHAT 73 64   TBILIRUBIN 0.7 0.7     Recent Labs     05/20/24 2147 05/21/24 0211 05/21/24  0641 05/21/24  0955   RBC 4.06* 3.78*  --   --    HEMOGLOBIN 12.9* 12.0* 12.5* 12.4*   HEMATOCRIT 38.2* 35.4* 38.1* 37.8*   PLATELETCT 198 192  --   --      Recent Results (from the past 24 hour(s))   CBC WITH DIFFERENTIAL    Collection Time: 05/20/24  9:47 PM   Result Value Ref Range    WBC 14.3 (H) 4.8 - 10.8 K/uL    RBC 4.06 (L) 4.70 - 6.10 M/uL    Hemoglobin 12.9 (L) 14.0 - 18.0 g/dL    Hematocrit 38.2 (L) 42.0 - 52.0 %    MCV 94.1 81.4 - 97.8 fL    MCH 31.8 27.0 - 33.0 pg    MCHC 33.8 32.3 - 36.5 g/dL    RDW 45.5 35.9 - 50.0 fL    Platelet Count 198 164 - 446 K/uL    MPV 11.3 9.0 - 12.9 fL    Neutrophils-Polys 74.20 (H) 44.00 - 72.00 %    Lymphocytes 14.70 (L) 22.00 - 41.00 %    Monocytes 8.90 0.00 - 13.40 %    Eosinophils 1.30 0.00 - 6.90 %    Basophils 0.60 0.00 - 1.80 %    Immature Granulocytes 0.30 0.00 - 0.90 %    Nucleated RBC 0.00 0.00 - 0.20 /100 WBC    Neutrophils (Absolute) 10.63 (H) 1.82 - 7.42 K/uL    Lymphs (Absolute) 2.11 1.00 - 4.80 K/uL    Monos (Absolute) 1.28 (H) 0.00 - 0.85 K/uL    Eos (Absolute) 0.18 0.00 - 0.51 K/uL    Baso (Absolute) 0.08 0.00 - 0.12 K/uL    Immature Granulocytes (abs) 0.05 0.00 - 0.11 K/uL    NRBC (Absolute) 0.00 K/uL   Comp Metabolic Panel    Collection Time: 05/20/24  9:47 PM    Result Value Ref Range    Sodium 142 135 - 145 mmol/L    Potassium 3.8 3.6 - 5.5 mmol/L    Chloride 109 96 - 112 mmol/L    Co2 23 20 - 33 mmol/L    Anion Gap 10.0 7.0 - 16.0    Glucose 87 65 - 99 mg/dL    Bun 16 8 - 22 mg/dL    Creatinine 0.87 0.50 - 1.40 mg/dL    Calcium 8.3 (L) 8.5 - 10.5 mg/dL    Correct Calcium 8.5 8.5 - 10.5 mg/dL    AST(SGOT) 15 12 - 45 U/L    ALT(SGPT) 9 2 - 50 U/L    Alkaline Phosphatase 73 30 - 99 U/L    Total Bilirubin 0.7 0.1 - 1.5 mg/dL    Albumin 3.7 3.2 - 4.9 g/dL    Total Protein 6.4 6.0 - 8.2 g/dL    Globulin 2.7 1.9 - 3.5 g/dL    A-G Ratio 1.4 g/dL   LACTIC ACID    Collection Time: 05/20/24  9:47 PM   Result Value Ref Range    Lactic Acid 1.0 0.5 - 2.0 mmol/L   BLOOD CULTURE    Collection Time: 05/20/24  9:47 PM    Specimen: Peripheral; Blood   Result Value Ref Range    Significant Indicator NEG     Source BLD     Site PERIPHERAL     Culture Result       No Growth  Note: Blood cultures are incubated for 5 days and  are monitored continuously.Positive blood cultures  are called to the RN and reported as soon as  they are identified.     BLOOD CULTURE    Collection Time: 05/20/24  9:47 PM    Specimen: Peripheral; Blood   Result Value Ref Range    Significant Indicator NEG     Source BLD     Site PERIPHERAL     Culture Result       No Growth  Note: Blood cultures are incubated for 5 days and  are monitored continuously.Positive blood cultures  are called to the RN and reported as soon as  they are identified.     EXTRA TUBE,EMILY    Collection Time: 05/20/24  9:47 PM   Result Value Ref Range    Extra Tube, Blue SORTED    MAGNESIUM    Collection Time: 05/20/24  9:47 PM   Result Value Ref Range    Magnesium 2.0 1.5 - 2.5 mg/dL   PHOSPHORUS    Collection Time: 05/20/24  9:47 PM   Result Value Ref Range    Phosphorus 2.5 2.5 - 4.5 mg/dL   ESTIMATED GFR    Collection Time: 05/20/24  9:47 PM   Result Value Ref Range    GFR (CKD-EPI) 88 >60 mL/min/1.73 m 2   LACTIC ACID    Collection Time:  05/21/24  2:11 AM   Result Value Ref Range    Lactic Acid 0.7 0.5 - 2.0 mmol/L   CBC without Differential    Collection Time: 05/21/24  2:11 AM   Result Value Ref Range    WBC 13.5 (H) 4.8 - 10.8 K/uL    RBC 3.78 (L) 4.70 - 6.10 M/uL    Hemoglobin 12.0 (L) 14.0 - 18.0 g/dL    Hematocrit 35.4 (L) 42.0 - 52.0 %    MCV 93.7 81.4 - 97.8 fL    MCH 31.7 27.0 - 33.0 pg    MCHC 33.9 32.3 - 36.5 g/dL    RDW 44.5 35.9 - 50.0 fL    Platelet Count 192 164 - 446 K/uL    MPV 11.5 9.0 - 12.9 fL   Comp Metabolic Panel (CMP)    Collection Time: 05/21/24  2:11 AM   Result Value Ref Range    Sodium 143 135 - 145 mmol/L    Potassium 3.9 3.6 - 5.5 mmol/L    Chloride 112 96 - 112 mmol/L    Co2 21 20 - 33 mmol/L    Anion Gap 10.0 7.0 - 16.0    Glucose 114 (H) 65 - 99 mg/dL    Bun 15 8 - 22 mg/dL    Creatinine 0.69 0.50 - 1.40 mg/dL    Calcium 7.7 (L) 8.5 - 10.5 mg/dL    Correct Calcium 8.2 (L) 8.5 - 10.5 mg/dL    AST(SGOT) 14 12 - 45 U/L    ALT(SGPT) 7 2 - 50 U/L    Alkaline Phosphatase 64 30 - 99 U/L    Total Bilirubin 0.7 0.1 - 1.5 mg/dL    Albumin 3.4 3.2 - 4.9 g/dL    Total Protein 6.0 6.0 - 8.2 g/dL    Globulin 2.6 1.9 - 3.5 g/dL    A-G Ratio 1.3 g/dL   Lipid Profile (Lipid Panel) Fasting    Collection Time: 05/21/24  2:11 AM   Result Value Ref Range    Cholesterol,Tot 91 (L) 100 - 199 mg/dL    Triglycerides 67 0 - 149 mg/dL    HDL 41 >=40 mg/dL    LDL 37 <100 mg/dL   ESTIMATED GFR    Collection Time: 05/21/24  2:11 AM   Result Value Ref Range    GFR (CKD-EPI) 94 >60 mL/min/1.73 m 2   LACTIC ACID    Collection Time: 05/21/24  6:41 AM   Result Value Ref Range    Lactic Acid 1.4 0.5 - 2.0 mmol/L   HEMOGLOBIN AND HEMATOCRIT    Collection Time: 05/21/24  6:41 AM   Result Value Ref Range    Hemoglobin 12.5 (L) 14.0 - 18.0 g/dL    Hematocrit 38.1 (L) 42.0 - 52.0 %   HEMOGLOBIN AND HEMATOCRIT    Collection Time: 05/21/24  9:55 AM   Result Value Ref Range    Hemoglobin 12.4 (L) 14.0 - 18.0 g/dL    Hematocrit 37.8 (L) 42.0 - 52.0 %          Imaging:  CT-CTA COMPLETE THORACOABDOMINAL AORTA  Narrative:   5/20/2024 11:50 PM    HISTORY/REASON FOR EXAM:  Chest pain, history of abdominal aortic aneurysm..    TECHNIQUE/EXAM DESCRIPTION:    CT angiogram without and with contrast, with reconstructions.    Initial precontrast thick helical sections were obtained from the top of the aortic arch through the diaphragmatic domes. Following this, thin-section postcontrast helical images were obtained from the lung apices through the iliac crests following the   bolus administration of 80 mL of nonionic Omnipaque 350 contrast.  CT angiographic technique was utilized.    Parasagittal reconstructed images of the aorta were generated utilizing multiplanar volume reformat technique.    Low dose optimization technique was utilized for this CT exam including automated exposure control and adjustment of the mA and/or kV according to patient size.    COMPARISON: None available.    FINDINGS:    Aorta: Pre-contrast images demonstrate no evidence of displaced calcified intima or intramural hematoma. Atherosclerotic changes are seen including atherosclerotic coronary artery calcifications.  Aortic arch: Branching pattern is conventional.  Diameter: 4.4 x 4.0 cm fusiform aneurysm is seen in the distal thoracic aorta. There is infrarenal abdominal aortic stent graft in place within 4.1 cm infrarenal abdominal aortic aneurysm.  Dissection: Absent.  Celiac artery origin: Widely patent.  Superior mesenteric artery origin: Widely patent.  Renal artery origins: Widely patent.  Inferior mesenteric artery: Patent.  Common iliac arteries: Nonaneurysmal and patent.    Heart: Normal size. No pericardial effusion. Coronary artery origins are conventional.  Pulmonary arteries: The central pulmonary arteries demonstrate no large central pulmonary embolus.    3D angiographic/MIP images of the vasculature confirm the vascular findings as described above.    Lungs: No opacity or pleural  fluid identified. Extensive emphysematous changes are seen. Linear densities the bilateral lung bases are seen favoring changes of atelectasis.  Liver: Homogeneous enhancement. No masses identified.  Biliary system: No intrahepatic or extrahepatic ductal dilatation. Calcified gallstone is seen.  Spleen: Unremarkable.  Adrenal glands: Unremarkable.  Pancreas: Unremarkable.  Kidneys: Symmetric enhancement. No solid mass is identified.  Bowel: Unremarkable. No pneumoperitoneum.  Ascites: None.  Lymph nodes: No adenopathy is identified.  Bones: Unremarkable.  Impression: 1.  4.1 cm infrarenal abdominal aortic aneurysm with stent graft in place.  2.  4.4 x 4.0 cm fusiform distal thoracic aortic aneurysm, radiographic follow-up and surveillance recommended as clinically appropriate.  3.  Emphysema  4.  Cholelithiasis  5.  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  Patient has evidence of proctocolitis.  No bloody bowel movements since hospitalized here.  Patient denies history of colonoscopy.  He has evidence of vasculopathy on cross-sectional imaging.  He has a patent stent graft in his infrarenal aorta.  The patient's bloody bowel movements have subsided.  His hemoglobin is stable.  Possibilities include ischemic colitis, infectious colitis, initial  presentation of inflammatory bowel disease.  Would favor this is more consistent with ischemic colitis.    Recommendations:  Check stool culture and C. Difficile.  Orders placed.  If stool studies are negative consider flexible sigmoidoscopy for biopsies versus full colonoscopy based on patient's clinical course.  Decisions regarding endoscopic evaluation deferred at this time.  Full liquid diet.  Orders placed.

## 2024-05-21 NOTE — ASSESSMENT & PLAN NOTE
I spent 17 minutes at bedside with nursing staff present with patient discussing work-up, results, diagnosis, prognosis. Talked to patient about his COPD, severe protein calorie malnutrition, hx of AAA, now presenting with ischemic bowel/infectious and a renal infarct    CODE STATUS discussed with patient and wants to be Full Code   - PT OT recommended home health but per  is no [facility].  Discussed with son of the patient about considering a SNF but he refers that patient does not want it.

## 2024-05-21 NOTE — ASSESSMENT & PLAN NOTE
Several day history of bright red blood per rectum  CT angiography of the abdomen pelvis with IV contrast was obtained.  There is severe wall thickening the sigmoid colon and rectum consistent with colitis and proctitis.  This could be due to infectious versus inflammatory versus ischemic etiology.  Inferior mesenteric artery is not clearly seen.    -Pantoprazole 40mg IV BID  -H/H X0blmlm   -Transfuse for hemoglobin less than 7  -Empirically started the patient on IV Zosyn  -Switching to cefuroxime plus metronidazole, per ID Pharm recommendations  -5/24 switch antibiotics to Augmentin p.o.  -FOBT pending  -General Surgery consulted: No surgical intervention at this point   -Vascular surgery consulted: No concerns about renal infarct. Do not recommend systemic anticoagulation in this patient for that indication and did not recommend any procedure at this time. Recommend getting an echocardiogram to rule out thromboembolic status along with the CTA of his chest as well  -GI consulted will perform flexible sigmoidoscopy on 5/24.

## 2024-05-21 NOTE — PROGRESS NOTES
[facility] Internal Medicine Daily Progress Note    Date of Service  5/21/2024    [facility] Team: R IM Green Team   Attending: Nick Edmonds M.d.  Senior Resident: Dr. Izaguirre  Intern:  Dr. Novak  Contact Number: [phone]    Chief Complaint  Indio Cohen is a 78 y.o. male admitted 5/20/2024 with abdominal pain and bright red blood per rectum.    Hospital Course  Indio Cohen is a 78 y.o. male who presented on 5/20/2024 as a transfer from outside facility.  Patient initially presented to outside facility complaining of abdominal pain and bright red blood per rectum.  Stated that the pain and bloody bowel movements were going on for 2 to 3 days now.  Denies any fevers, chills, nausea, vomiting.  He states that the pain is worse in the left upper quadrant.  CT angiography of the abdomen pelvis with IV contrast was obtained.  There is severe wall thickening the sigmoid colon and rectum consistent with colitis and proctitis. This could be due to infectious versus inflammatory versus ischemic etiology.  Inferior mesenteric artery is not clearly seen.  There is a 4.1 cm abdominal aortic aneurysm with stent graft present that appears to be stable compared to December 2023.  There is a patch uptake of contrast in the posterior aspect of the left kidney extending into the upper pole of the left kidney.  This could be due to ischemia or partial infarction approximately 20% of the left kidney.   CT scan of the chest shows moderate emphysematous changes.  Coronary artery disease.  There is moderate size hiatal hernia.  4.8 mm nodule at the left lung base. Labs were notable for leukocytosis greater than 17,000.  Hemoglobin appears to be stable at 14.  He had a creatinine level of 1.32, which appears to be near his baseline.  Lactic acid was 1.3.  Urinalysis unremarkable.   Case is been discussed with both general surgery and vascular surgery.  No recommended surgical intervention at this time.  GI consulted: Order is not  stool culture, C. difficile and start full liquid diet. If stool studies are negative consider flexible sigmoidoscopy for biopsies versus full colonoscopy based on patient's clinical course.         Interval Problem Update  No acute events overnight.  Patient was seen and evaluated at bedside this a.m., refers some left-sided abdominal pain.  Denies nausea, vomiting, chest pain or shortness of breath.  Patient reported last bowel movement with blood was last night. GI consulted: Order is not stool culture, C. difficile and start full liquid diet. If stool studies are negative consider flexible sigmoidoscopy for biopsies versus full colonoscopy based on patient's clinical course.     I have discussed this patient's plan of care and discharge plan at IDT rounds today with Case Management, Nursing, Nursing leadership, and other members of the IDT team.    Consultants/Specialty  GI  General surgery  Vascular surgery    Code Status  Full Code    Disposition  The patient is not medically cleared for discharge to home or a post-acute facility.      I have placed the appropriate orders for post-discharge needs.    Review of Systems  Review of Systems   Constitutional:  Negative for chills, fever, malaise/fatigue and weight loss.   HENT:  Negative for congestion and sore throat.    Eyes:  Negative for blurred vision and double vision.   Respiratory:  Negative for cough, sputum production and shortness of breath.    Cardiovascular:  Negative for chest pain, palpitations and leg swelling.   Gastrointestinal:  Positive for abdominal pain (Left-sided). Negative for constipation, diarrhea, nausea and vomiting.   Genitourinary:  Negative for dysuria.   Musculoskeletal:  Negative for back pain, joint pain and myalgias.   Neurological:  Negative for dizziness, weakness and headaches.   Psychiatric/Behavioral:  Negative for substance abuse. The patient is not nervous/anxious.         Physical Exam  Temp:  [36.5 °C (97.7 °F)-37.5 °C  (99.5 °F)] 37.1 °C (98.8 °F)  Pulse:  [58-80] 80  Resp:  [16] 16  BP: (103-144)/() 113/59  SpO2:  [94 %-100 %] 97 %    Physical Exam  Constitutional:       General: He is not in acute distress.     Appearance: Normal appearance. He is not ill-appearing.   HENT:      Head: Normocephalic and atraumatic.      Nose: Nose normal.      Mouth/Throat:      Mouth: Mucous membranes are moist.   Eyes:      Extraocular Movements: Extraocular movements intact.      Conjunctiva/sclera: Conjunctivae normal.      Pupils: Pupils are equal, round, and reactive to light.   Cardiovascular:      Rate and Rhythm: Normal rate and regular rhythm.      Pulses: Normal pulses.      Heart sounds: Normal heart sounds.   Pulmonary:      Effort: Pulmonary effort is normal. No respiratory distress.      Breath sounds: Normal breath sounds.   Chest:      Chest wall: No tenderness.   Abdominal:      General: Bowel sounds are normal. There is no distension.      Palpations: Abdomen is soft.      Tenderness: There is abdominal tenderness (Left-sided). There is no right CVA tenderness or left CVA tenderness.   Musculoskeletal:         General: No swelling or tenderness. Normal range of motion.      Cervical back: Normal range of motion and neck supple.      Right lower leg: No edema.      Left lower leg: No edema.   Skin:     General: Skin is warm.      Capillary Refill: Capillary refill takes less than 2 seconds.      Coloration: Skin is not jaundiced or pale.   Neurological:      General: No focal deficit present.      Mental Status: He is alert and oriented to person, place, and time.      Cranial Nerves: No cranial nerve deficit.      Sensory: No sensory deficit.      Motor: No weakness.   Psychiatric:         Mood and Affect: Mood normal.         Fluids    Intake/Output Summary (Last 24 hours) at 5/21/2024 1301  Last data filed at 5/21/2024 1200  Gross per 24 hour   Intake 533.91 ml   Output 550 ml   Net -16.09 ml       Laboratory  Recent  Labs     05/20/24 2147 05/21/24  0211 05/21/24  0641 05/21/24  0955   WBC 14.3* 13.5*  --   --    RBC 4.06* 3.78*  --   --    HEMOGLOBIN 12.9* 12.0* 12.5* 12.4*   HEMATOCRIT 38.2* 35.4* 38.1* 37.8*   MCV 94.1 93.7  --   --    MCH 31.8 31.7  --   --    MCHC 33.8 33.9  --   --    RDW 45.5 44.5  --   --    PLATELETCT 198 192  --   --    MPV 11.3 11.5  --   --      Recent Labs     05/20/24 2147 05/21/24 0211   SODIUM 142 143   POTASSIUM 3.8 3.9   CHLORIDE 109 112   CO2 23 21   GLUCOSE 87 114*   BUN 16 15   CREATININE 0.87 0.69   CALCIUM 8.3* 7.7*             Recent Labs     05/21/24 0211   TRIGLYCERIDE 67   HDL 41   LDL 37       Imaging  CT-CTA COMPLETE THORACOABDOMINAL AORTA   Final Result         1.  4.1 cm infrarenal abdominal aortic aneurysm with stent graft in place.   2.  4.4 x 4.0 cm fusiform distal thoracic aortic aneurysm, radiographic follow-up and surveillance recommended as clinically appropriate.   3.  Emphysema   4.  Cholelithiasis   5.  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  There is severe wall thickening the sigmoid colon and rectum consistent with colitis and proctitis.  This could be due to infectious versus inflammatory versus ischemic etiology.  Inferior mesenteric artery is not clearly seen.    -Pantoprazole 40mg IV BID  -H/H B1bzoku   -Transfuse for hemoglobin less than 7  -Empirically started the patient on IV Zosyn  -Switching to cefuroxime plus metronidazole, per ID Pharm recommendations  -FOBT pending  -General Surgery consulted: No surgical intervention at this point    -Vascular surgery consulted: No concerns about renal infarct. Do not recommend systemic anticoagulation in this patient for that indication and did not recommend any procedure at this time. Recommend getting an echocardiogram to rule out thromboembolic status along with the CTA of his chest as well  -GI consulted: Order is not stool culture, C. difficile and start full liquid diet. If stool studies are negative consider flexible sigmoidoscopy for biopsies versus full colonoscopy based on patient's clinical course.     Advanced care planning/counseling discussion  Assessment & Plan  I spent 17 minutes at bedside with nursing staff present with patient discussing work-up, results, diagnosis, prognosis. Talked to patient about his COPD, severe protein calorie malnutrition, hx of AAA, now presenting with ischemic bowel/infectious and a renal infarct    CODE STATUS discussed with patient and wants to be Full Code       Renal infarct (HCC)  Assessment & Plan  There is a patch uptake of contrast in the posterior aspect of the left kidney extending into the upper pole of the left kidney.  This could be due to ischemia or partial infarction approximately 20% of the left kidney.  Vascular surgery has been consulted.  No indication for any emergent surgical intervention  Further discussion was had with vascular surgeon.  He does not need anticoagulation from the renal infarct perspective.  This all likely secondary to the placement of the stent graft.  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  His SMA appears to be okay.  Recommending management for his GI bleed versus colitis.  Patient will need follow-up with whoever placed the stent graft for surveillance.   He did recommend getting an echocardiogram to rule out thromboembolic status along with the CTA of his chest as well  -Echo pending  -CTA of the chest wall remarkable for 4.1 cm infrarenal abdominal aortic aneurysm with  stent graft in place, 4.4 x 4.0 cm fusiform distal thoracic aortic aneurysm, radiographic follow-up and surveillance recommended as clinically appropriate. Emphysema. Cholelithiasis. Atherosclerosis and atherosclerotic coronary artery disease.       GI bleed  Assessment & Plan  Patient states he is happy  Blood per rectum for last 2 to 3 days.'s likely secondary to the colitis versus proctitis seen on CT imaging  -Pantoprazole 40 mg IV twice daily  -Hemoglobin hematocrit every 4 hours  -Transfuse for hemoglobin less than 7  -GI consulted: Order is not stool culture, C. difficile and start full liquid diet. If stool studies are negative consider flexible sigmoidoscopy for biopsies versus full colonoscopy based on patient's clinical course.     Severe protein-calorie malnutrition (HCC)  Assessment & Plan  BMI 15.91  -Nutrition consult placed    Smoking- (present on admission)  Assessment & Plan  Patient smokes < 1PPD.  Nicotine patch and/or gum offered.   I discussed cessation with patient including starting on nicotine patch and/or gum on discharge.  I also discussed medications to help with cessation with patient including Wellbutrin and Chantix, offered .  Smoking cessation discussed with patient for 5 minutes.      AAA (abdominal aortic aneurysm) (HCC)- (present on admission)  Assessment & Plan  History of 4.1cm AAA seen on CTA.   -Graft in place   -Appears to be stable   -No hemodynamically instability         VTE prophylaxis: SCDs/TEDs    I have performed a physical exam and reviewed and updated ROS and Plan today (5/21/2024). In review of yesterday's note (5/20/2024), there are no changes except as documented above.    Linda Novak MD  Internal Medicine PGY-1

## 2024-05-21 NOTE — HOSPITAL COURSE
Indio Cohen is a 78 y.o. male who presented on 5/20/2024 as a transfer from outside facility.  Patient initially presented to outside facility complaining of abdominal pain and bright red blood per rectum.  Stated that the pain and bloody bowel movements were going on for 2 to 3 days prior admission.  Denied any fevers, chills, nausea, vomiting.  He reported that the pain was worse in the left upper quadrant. CT angiography of the abdomen pelvis with IV contrast was obtained.  There was severe wall thickening in the sigmoid colon and rectum consistent with colitis and proctitis. This could be due to infectious versus inflammatory versus ischemic etiology.  Inferior mesenteric artery was not clearly seen. There is a 4.1 cm abdominal aortic aneurysm with stent graft present that appears to be stable compared to December 2023. There is a patch uptake of contrast in the posterior aspect of the left kidney extending into the upper pole of the left kidney.  This could be due to ischemia or partial infarction approximately 20% of the left kidney. CT scan of the chest shows moderate emphysematous changes.  Coronary artery disease.  There is moderate size hiatal hernia.  4.8 mm nodule at the left lung base. Labs were notable for leukocytosis greater than 17,000.  Hemoglobin appears to be stable at 14.  He had a creatinine level of 1.32, which appears to be near his baseline.  Lactic acid was 1.3.  Urinalysis unremarkable. Case was discussed with both general surgery and vascular surgery.  No recommended surgical intervention at this time.  GI consulted: Ordered stool culture, C. difficile and start full liquid diet. If stool studies were negative they were to consider flexible sigmoidoscopy for biopsies versus full colonoscopy based on patient's clinical course.  Patient did not had any bowel movement or episodes of diarrhea.  GI proceed with flexible sigmoidoscopy with biopsies on 5/24.  On day of discharge patient is  clinically and hemodynamically stable.  Plan to discharge him with vitamin D replacement, low fiber, lactose-free diet for 2 weeks per GI recommendations.  Recommended to follow-up with primary care provider regarding recent admission and follow-up with GI for pathology results from biopsies.  Follow-up with physical therapy sessions outpatient.  Patient and family in agreement with plan.  Will discharge him home.

## 2024-05-21 NOTE — THERAPY
Occupational Therapy   Initial Evaluation     Patient Name: Indio Cohen  Age:  78 y.o., Sex:  male  Medical Record #: [id number]  Today's Date: 5/21/2024     Precautions  Precautions: (P) Fall Risk  Comments: Special contact isolation for R/O C diff placed after PT session    Assessment    Patient is 78 y.o. male admitted for abdominal pain, bright red blood, found to have ischemic bowel disease, renal infarct, GI bleed; PMH of AA s/p graft, hernia. Pt states normally independent with functional mobility and ADLs living in a 2 story home with son who intermittently assists as needed. Pt able to complete all functional mobility and ADLs with supervision, no AD used intermittent HHA provided. Anticipate pt is at or near his functional baseline, will complete OT order at this time.       Plan    DC Equipment Recommendations: (P) None  Discharge Recommendations: (P) Recommend home health for continued occupational therapy services     Objective       05/21/24 1440   Prior Living Situation   Prior Services Home-Independent   Housing / Facility 1 Story House   Steps Into Home 4   Steps In Home 0   Rail None   Bathroom Set up Walk In Shower;Shower Chair   Equipment Owned Crutches;Single Point Cane   Lives with - Patient's Self Care Capacity Adult Children   Comments Son assists intermittently as needed   Prior Level of ADL Function   Self Feeding Independent   Grooming / Hygiene Independent   Bathing Independent   Dressing Independent   Toileting Independent   Prior Level of IADL Function   Medication Management Independent   Laundry Requires Assist   Kitchen Mobility Requires Assist   Finances Requires Assist   Home Management Requires Assist   Shopping Requires Assist   Prior Level Of Mobility Independent Without Device in Home   Driving / Transportation Relatives / Others Provide Transportation   Occupation (Pre-Hospital Vocational) Retired Due To Age   History of Falls   History of Falls Yes   Date of Last Fall    (multiple prior to admission)   Precautions   Precautions Fall Risk   Pain 0 - 10 Group   Therapist Pain Assessment Post Activity Pain Same as Prior to Activity;Nurse Notified   Cognition    Cognition / Consciousness WDL   Level of Consciousness Alert   Active ROM Upper Body   Active ROM Upper Body  WDL   Strength Upper Body   Upper Body Strength  WDL   Sensation Upper Body   Upper Extremity Sensation  WDL   Upper Body Muscle Tone   Upper Body Muscle Tone  WDL   Neurological Concerns   Neurological Concerns No   Coordination Upper Body   Coordination WDL   Balance Assessment   Sitting Balance (Static) Good   Sitting Balance (Dynamic) Fair +   Standing Balance (Static) Fair   Standing Balance (Dynamic) Fair -   Weight Shift Sitting Good   Weight Shift Standing Good   Bed Mobility    Supine to Sit Supervised   Scooting Supervised   Rolling Supervised   ADL Assessment   Grooming Supervision;Standing   Upper Body Dressing Supervision   Lower Body Dressing Supervision   Toileting   (NT-refused need, states no assist needed)   How much help from another person does the patient currently need...   Putting on and taking off regular lower body clothing? 4   Bathing (including washing, rinsing, and drying)? 4   Toileting, which includes using a toilet, bedpan, or urinal? 4   Putting on and taking off regular upper body clothing? 4   Taking care of personal grooming such as brushing teeth? 4   Eating meals? 4   6 Clicks Daily Activity Score 24   Functional Mobility   Sit to Stand Standby Assist   Bed, Chair, Wheelchair Transfer Standby Assist   Toilet Transfers Standby Assist   Transfer Method Stand Step   Mobility bed mobility, bathroom mobility, up to sink, left seated in chair   Comments no AD   Activity Tolerance   Sitting in Chair left seated in chair   Standing 12 min   Education Group   Education Provided Role of Occupational Therapist   Role of Occupational Therapist Patient Response Patient;Acceptance;Explanation    Problem List   Problem List None   Anticipated Discharge Equipment and Recommendations   DC Equipment Recommendations None   Discharge Recommendations Recommend home health for continued occupational therapy services   Interdisciplinary Plan of Care Collaboration   IDT Collaboration with  Nursing   Patient Position at End of Therapy Seated;Chair Alarm On;Call Light within Reach;Tray Table within Reach;Phone within Reach   Collaboration Comments RN updated

## 2024-05-21 NOTE — DISCHARGE PLANNING
Case Management Discharge Planning    Admission Date: 5/20/2024  GMLOS: 4.4  ALOS: 1    6-Clicks ADL Score: 24  6-Clicks Mobility Score: 22      Anticipated Discharge Dispo: Discharge Disposition: Discharged to home/self care (01)    DME Needed: No    Action(s) Taken: Updated Provider/Nurse on Discharge Plan, Patient discussed during IDT rounds with medical team.    Escalations Completed: None    Medically Clear: No    Next Steps: Case Management will continue to follow for discharge planning needs.    Barriers to Discharge: Medical clearance    Is the patient up for discharge tomorrow: No    RN Case Manager met with patient at bedside and obtained the information used in this assessment. Patient verified accuracy of facesheet; patient lives in a single story home with his son.  Prior to current hospitalization, patient was assisted with ADLS/IADLS. Patient son drives and he is able to attend necessary MD appointments. Patient's PCP is Klaudia Arana and prefers to use [facility]. Patient has no financial concerns. Patient has support from his son. Denies any history of substance use and denies any diagnosis of mental illness.    Care Transition Team Assessment    Information Source: Patient and Son.  Orientation Level: Oriented X4  Information Given By: Patient (And son)    Readmission Evaluation  Is this a readmission?: No    Elopement Risk  Legal Hold: No  Ambulatory or Self Mobile in Wheelchair: Yes  Disoriented: No  Psychiatric Symptoms: None  History of Wandering: No  Elopement this Admit: No  Vocalizing Wanting to Leave: No  Displays Behaviors, Body Language Wanting to Leave: No-Not at Risk for Elopement    Interdisciplinary Discharge Planning  Does Admitting Nurse Feel This Could be a Complex Discharge?: No  Primary Care Physician: Klaudia Arana  Lives with - Patient's Self Care Capacity: Adult Children  Patient or legal guardian wants to designate a caregiver: Yes  Caregiver name: Warren  Caregiver  contact info: [phone]  ([facility] [place] Authorization for Release of Health Information has been completed: Yes  Support Systems: Family Member(s)  Housing / Facility: Mobile Home  Name of Care Facility: N/A  Able to Return to Previous ADL's: Yes  Mobility Issues: No  Patient Prefers to be Discharged to:: Home  Assistance Needed: Yes  Durable Medical Equipment: Not Applicable    Discharge Preparedness  What is your plan after discharge?: Home with help  What are your discharge supports?: Child  Prior Functional Level: Ambulatory, Needs Assist with Activities of Daily Living, Needs Assist with Medication Management  Difficulity with ADLs: None  Difficulity with IADLs: Driving    Functional Assesment  Prior Functional Level: Ambulatory, Needs Assist with Activities of Daily Living, Needs Assist with Medication Management    Finances  Financial Barriers to Discharge: No  Prescription Coverage: Yes    Vision / Hearing Impairment  Vision Impairment : No  Hearing Impairment : No    Values / Beliefs / Concerns  Values / Beliefs Concerns : No    Advance Directive  Advance Directive?:  (Advanced directive)    Domestic Abuse  Possible Abuse/Neglect Reported to:: Not Applicable    Psychological Assessment  History of Substance Abuse: None  History of Psychiatric Problems: No    Discharge Risks or Barriers  Discharge risks or barriers?: No    Anticipated Discharge Information  Discharge Disposition: Discharged to home/self care (01)

## 2024-05-21 NOTE — CARE PLAN
The patient is Stable - Low risk of patient condition declining or worsening    Shift Goals  Clinical Goals: IV abx, monitor labs, VSS, plan of care  Patient Goals: rest  Family Goals: updates    Progress made toward(s) clinical / shift goals:    Problem: Hemodynamics  Goal: Patient's hemodynamics, fluid balance and neurologic status will be stable or improve  Description: Target End Date:  Prior to discharge or change in level of care    Document on Assessment and I/O flowsheet templates    1.  Monitor vital signs, pulse oximetry and cardiac monitor per provider order and/or policy  2.  Maintain blood pressure per provider order  3.  Hemodynamic monitoring per provider order  4.  Manage IV fluids and IV infusions  5.  Monitor intake and output  6.  Daily weights per unit policy or provider order  7.  Assess peripheral pulses and capillary refill  8.  Assess color and body temperature  9.  Position patient for maximum circulation/cardiac output  10. Monitor for signs/symptoms of excessive bleeding  11. Assess mental status, restlessness and changes in level of consciousness  12. Monitor temperature and report fever or hypothermia to provider immediately. Consideration of targeted temperature management.  Outcome: Progressing     Problem: Respiratory  Goal: Patient will achieve/maintain optimum respiratory ventilation and gas exchange  Description: Target End Date:  Prior to discharge or change in level of care    Document on Assessment flowsheet    1.  Assess and monitor rate, rhythm, depth and effort of respiration  2.  Breath sounds assessed qshift and/or as needed  3.  Assess O2 saturation, administer/titrate oxygen as ordered  4.  Position patient for maximum ventilatory efficiency  5.  Turn, cough, and deep breath with splinting to improve effectiveness  6.  Collaborate with RT to administer medication/treatments per order  7.  Encourage use of incentive spirometer and encourage patient to cough after use and  utilize splinting techniques if applicable  8.  Airway suctioning  9.  Monitor sputum production for changes in color, consistency and frequency  10. Perform frequent oral hygiene  11. Alternate physical activity with rest periods  Outcome: Progressing  Flowsheets (Taken 5/21/2024 0008 by Shira Azar, C.N.A.)  O2 Delivery Device: None - Room Air

## 2024-05-21 NOTE — PROGRESS NOTES
"MEDICAL STUDENT NOTE  This note is intended for the purposes of medical student education and feedback only.   Please refer to the documentation by this patient's assigned medical practitioner for details of care and plans.    INTERNAL MEDICINE PROGRESS NOTE          PATIENT ID:  NAME:  nIdio Cohen  MRN:               [id number]  YOB: 1945    Date of Admission: 5/20/2024     Attending: Nick Edmonds M.d.     Medical Student: Nam Dumont    Primary Care Physician:  None Noted    HPI: Indio Cohen is a 78 y.o. male with hx of infrarenal AAA (s/p stent graft in 2013) admitted as direct transfer from [facility] for ischemic bowel disease and left renal infarct on hospital day 1. He initially presented to the transferring facility with complaints of left sided abdominal pain and bright red blood per rectum x3 days. The patient denied any fever, chills, nausea or vomiting. He reported no lightheadedness or dizziness. The patient was ultimately transferred to obtain General Surgery and Vascular consults.     Labs at the outlying facility revealed a WBC of 17 and he was started on Zosyn. CTA abdomen pelvis also showed severe wall thickening of sigmoid colon and rectum consistent with colitis and Proctitis. This could be due to infectious vs. Inflammatory vs ischemic etiology. CTA also revealed 20% infarct vs ischemia of left kidney.     Interval Problem Update  Hemoglobin stable at 12.5 this AM    Echo pending per Vascular recommendation    GI consult pending    I have personally seen and examined the patient at bedside. I discussed the plan of care with patient and family.    SUBJECTIVE:   No acute events overnight, patient reports to feel improved. He has less abdominal pain and is hungry. Despite NPO order, the patient did consume almost a whole donut this morning.     OBJECTIVE:    PHYSICAL EXAM:  /61   Pulse 67   Temp 37.5 °C (99.5 °F) (Temporal)   Resp 16   Ht 1.676 m (5' 6\")   Wt " 44.7 kg (98 lb 8.7 oz)   SpO2 96%   BMI 15.91 kg/m²   Physical Exam  Constitutional:       Appearance: Normal appearance.   Cardiovascular:      Rate and Rhythm: Normal rate and regular rhythm.      Pulses: Normal pulses.   Pulmonary:      Effort: Pulmonary effort is normal.      Breath sounds: Normal breath sounds.   Abdominal:      General: Abdomen is flat.      Tenderness: There is abdominal tenderness (Left upper quadrant). There is no guarding or rebound.   Musculoskeletal:         General: No swelling.   Skin:     General: Skin is warm and dry.      Capillary Refill: Capillary refill takes less than 2 seconds.   Neurological:      Mental Status: He is alert and oriented to person, place, and time.   Psychiatric:         Mood and Affect: Mood normal.         Behavior: Behavior normal.           Intake/Output Summary (Last 24 hours) at 5/21/2024 0632  Last data filed at 5/21/2024 0500  Gross per 24 hour   Intake 533.91 ml   Output 550 ml   Net -16.09 ml       LABS:  Recent Labs     05/20/24 2147 05/21/24 0211   WBC 14.3* 13.5*   RBC 4.06* 3.78*   HEMOGLOBIN 12.9* 12.0*   HEMATOCRIT 38.2* 35.4*   MCV 94.1 93.7   MCH 31.8 31.7   RDW 45.5 44.5   PLATELETCT 198 192   MPV 11.3 11.5   NEUTSPOLYS 74.20*  --    LYMPHOCYTES 14.70*  --    MONOCYTES 8.90  --    EOSINOPHILS 1.30  --    BASOPHILS 0.60  --      Recent Labs     05/20/24 2147 05/21/24 0211   SODIUM 142 143   POTASSIUM 3.8 3.9   CHLORIDE 109 112   CO2 23 21   BUN 16 15   CREATININE 0.87 0.69   CALCIUM 8.3* 7.7*   MAGNESIUM 2.0  --    PHOSPHORUS 2.5  --    ALBUMIN 3.7 3.4     Estimated GFR/CRCL = CrCl cannot be calculated (Unknown ideal weight.).  Recent Labs     05/20/24 2147 05/21/24 0211   GLUCOSE 87 114*     Recent Labs     05/20/24 2147 05/21/24 0211   ASTSGOT 15 14   ALTSGPT 9 7   TBILIRUBIN 0.7 0.7   ALKPHOSPHAT 73 64   GLOBULIN 2.7 2.6       IMAGING:  CT-CTA COMPLETE THORACOABDOMINAL AORTA   Final Result         1.  4.1 cm infrarenal abdominal  aortic aneurysm with stent graft in place.   2.  4.4 x 4.0 cm fusiform distal thoracic aortic aneurysm, radiographic follow-up and surveillance recommended as clinically appropriate.   3.  Emphysema   4.  Cholelithiasis   5.  Atherosclerosis and atherosclerotic coronary artery disease      OUTSIDE IMAGES-CT CHEST   Final Result      OUTSIDE IMAGES-CT ABDOMEN /PELVIS   Final Result      EC-ECHOCARDIOGRAM COMPLETE W/O CONT    (Results Pending)       CULTURES:   Results       Procedure Component Value Units Date/Time    BLOOD CULTURE [[id number]] Collected: 05/20/24 2147    Order Status: Sent Specimen: Blood from Peripheral Updated: 05/20/24 2208    BLOOD CULTURE [[id number]] Collected: 05/20/24 2147    Order Status: Sent Specimen: Blood from Peripheral Updated: 05/20/24 2208            MEDS:  Current Facility-Administered Medications   Medication Last Admin    NS infusion New Bag at 05/20/24 2222    pantoprazole (Protonix) injection 40 mg 40 mg at 05/21/24 0557    acetaminophen (Tylenol) tablet 650 mg      piperacillin-tazobactam (Zosyn) 4.5 g in  mL IVPB 4.5 g at 05/21/24 0219     CONSULTS: General Surgery, Vascular, GI      ASSESSMENT/PLAN:  78 y.o. male with hx of left infrarenal AAA (s/p stent graft 2013) admitted for ischemic bowel disease and left renal infarct    #Ischemic Bowel Disease  Patient with left upper quadrant and bright red blood per rectum x3 days  CTA of abdomen/pelvis with severe wall thickening in the sigmoid colon and rectum consistent with colitis and proctitis. This could be due to infectious vs. Inflammatory vs ischemic etiology. JOHNY not clearly seen  Follow up CTA-Chest with patent Celiac, SMA, and JOHNY. No dissection.   Continue Pantoprazole 40 mg IV BID  Recheck H&H [id number]  Hgb currently 12.5, transfuse if Hgb <7  Will treat patient with Vitamin B12 1000 mg PO daily  WBC previously 17 at outlying facility, improved to 13.5  Per pharmacy, will switch from Zosyn to Flagyl 500 mg q12h and  Cefuroxime 750 mg q8h  General Surgery consulted and no indication for emergent surgical intervention, recommends follow up with GI   Continue to monitor with abdominal exams    #GI Bleed  Continue Pantoprazole 40 mg IV BID  Monitor H&H q4hr, transfuse if Hgb <7  Dr. Mendosa (GI) consulted and recommends stool culture and C. Difficile.   If stool studies negative, can consider flexible sigmoidoscopy for biopsies vs full colonoscopy  Patient can be taken off NPO and started on full liquid diet    #Renal Infarct of Left Kidney  CTA of Abdomen/Pelvis showing 20% ischemia or partial infarction of left kidney  Vascular consulted and has no indications for emergent surgical intervention or anticoagulation  Vascular believes this is secondary to stent graft, but recommends follow up outpatient for surveillance with whoever placed the stent graft   Vascular does recommend Echo and CTA-chest to evaluate for thromboembolic status    #AAA  CTA-chest with 4.1 cm infrarenal AAA with stent  Stable from December 2023  Continue surveillance monitoring    Core Measures:  Fluids: None   Lines: Peripheral IV  Abx: Flagyl + Cefuroxime   Diet: Full Liquid  PPX: pharmacologic prophylaxis contraindicated due to GI bleed    CODE Status: Full Code      Disposition  Patient is not medically cleared for discharge.   Anticipate discharge to to home with organized home healthcare and close outpatient follow-up.  I have placed the appropriate orders for post-discharge needs.      Nam Dumont, MS3  [employer]

## 2024-05-21 NOTE — PROGRESS NOTES
[facility]  Consult requested by:  Dr. Tylor Pappas  Reason for consult: Ischemic bowel  Is consult for transition of care: Yes  Pertinent history/hospital Course: Patient reported with 1 week of abdominal pain, actively bleeding from his intestines, CTA of the abdomen done which shows a 20% left kidney infarct, ischemic bowel, previous AAA that is stable.  The patient at this point has been unstable so a discussion was held with the surgeon at [facility] Dr. Duncan who said that the patient would need CTA and angiography and thus will need to go to [facility].  Spoke with the intensivist Dr. Villagomez at [facility] who recommended speaking with the surgical intensivist.  Surgical intensivist spoke directly with the sending physician Dr. Pappas and recommends admission to medicine.  Patient most likely will need urgent surgical consultation and vascular surgical consultation upon arrival.  Code Status: Full  Can the hospitalist sign off upon completion of required consult/outcomes requested: Yes  Assigned hospitalist: Call [phone] upon arrival for assignment to hospitalist    For any question or concerns regarding the care of this patient, please reach out to the assigned hospitalist.

## 2024-05-22 ENCOUNTER — APPOINTMENT (OUTPATIENT)
Dept: CARDIOLOGY | Facility: MEDICAL CENTER | Age: 79
DRG: 393 | End: 2024-05-22
Payer: COMMERCIAL

## 2024-05-22 LAB
ALBUMIN SERPL BCP-MCNC: 3 G/DL (ref 3.2–4.9)
ALBUMIN/GLOB SERPL: 1.2 G/DL
ALP SERPL-CCNC: 55 U/L (ref 30–99)
ALT SERPL-CCNC: 7 U/L (ref 2–50)
ANION GAP SERPL CALC-SCNC: 10 MMOL/L (ref 7–16)
AST SERPL-CCNC: 13 U/L (ref 12–45)
BASOPHILS # BLD AUTO: 0.7 % (ref 0–1.8)
BASOPHILS # BLD: 0.07 K/UL (ref 0–0.12)
BILIRUB SERPL-MCNC: 0.3 MG/DL (ref 0.1–1.5)
BUN SERPL-MCNC: 11 MG/DL (ref 8–22)
CALCIUM ALBUM COR SERPL-MCNC: 8.6 MG/DL (ref 8.5–10.5)
CALCIUM SERPL-MCNC: 7.8 MG/DL (ref 8.5–10.5)
CHLORIDE SERPL-SCNC: 111 MMOL/L (ref 96–112)
CO2 SERPL-SCNC: 20 MMOL/L (ref 20–33)
CREAT SERPL-MCNC: 0.62 MG/DL (ref 0.5–1.4)
EOSINOPHIL # BLD AUTO: 0.29 K/UL (ref 0–0.51)
EOSINOPHIL NFR BLD: 2.9 % (ref 0–6.9)
ERYTHROCYTE [DISTWIDTH] IN BLOOD BY AUTOMATED COUNT: 45.2 FL (ref 35.9–50)
GFR SERPLBLD CREATININE-BSD FMLA CKD-EPI: 97 ML/MIN/1.73 M 2
GLOBULIN SER CALC-MCNC: 2.5 G/DL (ref 1.9–3.5)
GLUCOSE SERPL-MCNC: 83 MG/DL (ref 65–99)
HCT VFR BLD AUTO: 32.8 % (ref 42–52)
HCT VFR BLD AUTO: 36.2 % (ref 42–52)
HCT VFR BLD AUTO: 36.6 % (ref 42–52)
HGB BLD-MCNC: 11 G/DL (ref 14–18)
HGB BLD-MCNC: 11.9 G/DL (ref 14–18)
HGB BLD-MCNC: 12.3 G/DL (ref 14–18)
IMM GRANULOCYTES # BLD AUTO: 0.03 K/UL (ref 0–0.11)
IMM GRANULOCYTES NFR BLD AUTO: 0.3 % (ref 0–0.9)
LYMPHOCYTES # BLD AUTO: 1.99 K/UL (ref 1–4.8)
LYMPHOCYTES NFR BLD: 20.1 % (ref 22–41)
MAGNESIUM SERPL-MCNC: 2 MG/DL (ref 1.5–2.5)
MCH RBC QN AUTO: 31.4 PG (ref 27–33)
MCHC RBC AUTO-ENTMCNC: 33.5 G/DL (ref 32.3–36.5)
MCV RBC AUTO: 93.7 FL (ref 81.4–97.8)
MONOCYTES # BLD AUTO: 1.02 K/UL (ref 0–0.85)
MONOCYTES NFR BLD AUTO: 10.3 % (ref 0–13.4)
NEUTROPHILS # BLD AUTO: 6.51 K/UL (ref 1.82–7.42)
NEUTROPHILS NFR BLD: 65.7 % (ref 44–72)
NRBC # BLD AUTO: 0 K/UL
NRBC BLD-RTO: 0 /100 WBC (ref 0–0.2)
PLATELET # BLD AUTO: 189 K/UL (ref 164–446)
PMV BLD AUTO: 11.2 FL (ref 9–12.9)
POTASSIUM SERPL-SCNC: 3.8 MMOL/L (ref 3.6–5.5)
PROT SERPL-MCNC: 5.5 G/DL (ref 6–8.2)
RBC # BLD AUTO: 3.5 M/UL (ref 4.7–6.1)
SODIUM SERPL-SCNC: 141 MMOL/L (ref 135–145)
WBC # BLD AUTO: 9.9 K/UL (ref 4.8–10.8)

## 2024-05-22 PROCEDURE — 700111 HCHG RX REV CODE 636 W/ 250 OVERRIDE (IP): Performed by: STUDENT IN AN ORGANIZED HEALTH CARE EDUCATION/TRAINING PROGRAM

## 2024-05-22 PROCEDURE — A9270 NON-COVERED ITEM OR SERVICE: HCPCS

## 2024-05-22 PROCEDURE — 700102 HCHG RX REV CODE 250 W/ 637 OVERRIDE(OP)

## 2024-05-22 PROCEDURE — 85014 HEMATOCRIT: CPT

## 2024-05-22 PROCEDURE — 700105 HCHG RX REV CODE 258

## 2024-05-22 PROCEDURE — 85018 HEMOGLOBIN: CPT

## 2024-05-22 PROCEDURE — 700111 HCHG RX REV CODE 636 W/ 250 OVERRIDE (IP): Mod: JG

## 2024-05-22 PROCEDURE — 99231 SBSQ HOSP IP/OBS SF/LOW 25: CPT | Performed by: NURSE PRACTITIONER

## 2024-05-22 PROCEDURE — 85025 COMPLETE CBC W/AUTO DIFF WBC: CPT

## 2024-05-22 PROCEDURE — 36415 COLL VENOUS BLD VENIPUNCTURE: CPT

## 2024-05-22 PROCEDURE — 83735 ASSAY OF MAGNESIUM: CPT

## 2024-05-22 PROCEDURE — 99232 SBSQ HOSP IP/OBS MODERATE 35: CPT | Performed by: NURSE PRACTITIONER

## 2024-05-22 PROCEDURE — 770020 HCHG ROOM/CARE - TELE (206)

## 2024-05-22 PROCEDURE — C9113 INJ PANTOPRAZOLE SODIUM, VIA: HCPCS | Performed by: STUDENT IN AN ORGANIZED HEALTH CARE EDUCATION/TRAINING PROGRAM

## 2024-05-22 PROCEDURE — 99233 SBSQ HOSP IP/OBS HIGH 50: CPT | Mod: GC | Performed by: INTERNAL MEDICINE

## 2024-05-22 PROCEDURE — 80053 COMPREHEN METABOLIC PANEL: CPT

## 2024-05-22 RX ORDER — CEFUROXIME AXETIL 500 MG/1
750 TABLET ORAL EVERY 8 HOURS
Status: COMPLETED | OUTPATIENT
Start: 2024-05-22 | End: 2024-05-24

## 2024-05-22 RX ORDER — METRONIDAZOLE 500 MG/1
500 TABLET ORAL EVERY 12 HOURS
Qty: 4 TABLET | Refills: 0 | Status: COMPLETED | OUTPATIENT
Start: 2024-05-22 | End: 2024-05-24

## 2024-05-22 RX ADMIN — CEFUROXIME AXETIL 750 MG: 500 TABLET ORAL at 14:42

## 2024-05-22 RX ADMIN — CYANOCOBALAMIN TAB 500 MCG 1000 MCG: 500 TAB at 04:27

## 2024-05-22 RX ADMIN — CEFUROXIME SODIUM 750 MG: 1.5 INJECTION, POWDER, FOR SOLUTION INTRAVENOUS at 05:35

## 2024-05-22 RX ADMIN — METRONIDAZOLE 500 MG: 500 INJECTION, SOLUTION INTRAVENOUS at 04:31

## 2024-05-22 RX ADMIN — CEFUROXIME AXETIL 750 MG: 500 TABLET ORAL at 20:19

## 2024-05-22 RX ADMIN — DONEPEZIL HYDROCHLORIDE 5 MG: 5 TABLET, FILM COATED ORAL at 20:19

## 2024-05-22 RX ADMIN — PANTOPRAZOLE SODIUM 40 MG: 40 INJECTION, POWDER, FOR SOLUTION INTRAVENOUS at 17:46

## 2024-05-22 RX ADMIN — PANTOPRAZOLE SODIUM 40 MG: 40 INJECTION, POWDER, FOR SOLUTION INTRAVENOUS at 04:27

## 2024-05-22 RX ADMIN — METRONIDAZOLE 500 MG: 500 TABLET ORAL at 17:46

## 2024-05-22 RX ADMIN — DOXEPIN HYDROCHLORIDE 10 MG: 10 CAPSULE ORAL at 20:19

## 2024-05-22 ASSESSMENT — ENCOUNTER SYMPTOMS
DOUBLE VISION: 0
WEIGHT LOSS: 0
NAUSEA: 0
WEAKNESS: 0
CONSTIPATION: 0
DEPRESSION: 0
SHORTNESS OF BREATH: 0
COUGH: 0
BLURRED VISION: 0
SPUTUM PRODUCTION: 0
CHILLS: 0
FEVER: 0
DIZZINESS: 0
WEAKNESS: 1
ABDOMINAL PAIN: 1
HEARTBURN: 0
NERVOUS/ANXIOUS: 0
SORE THROAT: 0
BACK PAIN: 1
PALPITATIONS: 0
MYALGIAS: 0
BACK PAIN: 0
VOMITING: 0
BLOOD IN STOOL: 0
HEADACHES: 0
DIARRHEA: 0

## 2024-05-22 ASSESSMENT — SOCIAL DETERMINANTS OF HEALTH (SDOH)
IN THE PAST 12 MONTHS, HAS THE ELECTRIC, GAS, OIL, OR WATER COMPANY THREATENED TO SHUT OFF SERVICE IN YOUR HOME?: NO
WITHIN THE PAST 12 MONTHS, THE FOOD YOU BOUGHT JUST DIDN'T LAST AND YOU DIDN'T HAVE MONEY TO GET MORE: NEVER TRUE
WITHIN THE PAST 12 MONTHS, YOU WORRIED THAT YOUR FOOD WOULD RUN OUT BEFORE YOU GOT THE MONEY TO BUY MORE: NEVER TRUE

## 2024-05-22 ASSESSMENT — PAIN DESCRIPTION - PAIN TYPE
TYPE: ACUTE PAIN
TYPE: ACUTE PAIN

## 2024-05-22 ASSESSMENT — FIBROSIS 4 INDEX: FIB4 SCORE: 2.05

## 2024-05-22 ASSESSMENT — LIFESTYLE VARIABLES: SUBSTANCE_ABUSE: 0

## 2024-05-22 NOTE — PROGRESS NOTES
MEDICAL STUDENT NOTE  This note is intended for the purposes of medical student education and feedback only.   Please refer to the documentation by this patient's assigned medical practitioner for details of care and plans.    INTERNAL MEDICINE PROGRESS NOTE          PATIENT ID:  NAME:  Indio Cohen  MRN:               [id number]  YOB: 1945    Date of Admission: 5/20/2024     Attending: Nick Edmonds M.d.     Medical Student: Nam Dumont    Primary Care Physician:  None Noted    HPI: Indio Cohen is a 78 y.o. male with hx of infrarenal AAA (s/p stent graft in 2013) admitted as direct transfer from [facility] for ischemic bowel disease and left renal infarc on hospital day 2. He initially presented to the transferring facility with complaints of left sided abdominal pain and bright red blood per rectum x3 days. The patient denied any lightheadedness or dizziness. The patient was ultimately transferred to obtain General Surgery and Vascular consults.     Labs at the outlying facility revealed a WBC of 17 and he was started on Zosyn. CTA Abdomen pelvis also showed severe wall thickening of sigmoid colon and rectum consistent with colitis and proctitis. This could be due to infection vs. Inflammatory vs. Ischemic etiology. CTA also revealed 20% infarct vs ischemia of left kidney    Interval Problem Update  Hemoglobin stable at 11.9 this AM, can switch to q1day checks    GI consulted, c-diff and stool culture pending per patient sample    Echo still pending    I have personally seen and examined the patient at bedside. I discussed the plan of care with patient, family, bedside RN, and pharmacy.    SUBJECTIVE:   No acute events overnight, patient reports no bleeding from rectum and feels improved. He states he is hungry and wants to eat something besides liquids. The patient did have two bowel movements overnight, but flushed them before nursing staff could take a sample for testing.  "    OBJECTIVE:    PHYSICAL EXAM:  /72   Pulse 66   Temp 37 °C (98.6 °F) (Temporal)   Resp 20   Ht 1.676 m (5' 6\")   Wt 46.3 kg (102 lb 1.2 oz)   SpO2 97%   BMI 16.48 kg/m²   Physical Exam  Constitutional:       Appearance: Normal appearance.   HENT:      Mouth/Throat:      Mouth: Mucous membranes are moist.   Cardiovascular:      Rate and Rhythm: Normal rate and regular rhythm.   Pulmonary:      Effort: Pulmonary effort is normal.      Breath sounds: Normal breath sounds.   Abdominal:      General: Abdomen is flat.      Palpations: Abdomen is soft.      Tenderness: There is abdominal tenderness (left upper and lower quadrant). There is no guarding or rebound.   Musculoskeletal:         General: No swelling.   Skin:     General: Skin is warm and dry.   Neurological:      Mental Status: He is alert and oriented to person, place, and time.   Psychiatric:         Mood and Affect: Mood normal.         Behavior: Behavior normal.           Intake/Output Summary (Last 24 hours) at 5/22/2024 0617  Last data filed at 5/22/2024 0605  Gross per 24 hour   Intake 1530.47 ml   Output 875 ml   Net 655.47 ml       LABS:  Recent Labs     05/20/24  2147 05/21/24  0211 05/21/24  0641 05/21/24  1332 05/21/24  2246 05/22/24  0157   WBC 14.3* 13.5*  --   --   --  9.9   RBC 4.06* 3.78*  --   --   --  3.50*   HEMOGLOBIN 12.9* 12.0*   < > 11.8* 10.6* 11.0*   HEMATOCRIT 38.2* 35.4*   < > 37.1* 32.2* 32.8*   MCV 94.1 93.7  --   --   --  93.7   MCH 31.8 31.7  --   --   --  31.4   RDW 45.5 44.5  --   --   --  45.2   PLATELETCT 198 192  --   --   --  189   MPV 11.3 11.5  --   --   --  11.2   NEUTSPOLYS 74.20*  --   --   --   --  65.70   LYMPHOCYTES 14.70*  --   --   --   --  20.10*   MONOCYTES 8.90  --   --   --   --  10.30   EOSINOPHILS 1.30  --   --   --   --  2.90   BASOPHILS 0.60  --   --   --   --  0.70    < > = values in this interval not displayed.     Recent Labs     05/20/24  2147 05/21/24  0211 05/22/24  0157   SODIUM 142 " 143 141   POTASSIUM 3.8 3.9 3.8   CHLORIDE 109 112 111   CO2 23 21 20   BUN 16 15 11   CREATININE 0.87 0.69 0.62   CALCIUM 8.3* 7.7* 7.8*   MAGNESIUM 2.0  --  2.0   PHOSPHORUS 2.5  --   --    ALBUMIN 3.7 3.4 3.0*     Estimated GFR/CRCL = CrCl cannot be calculated (Unknown ideal weight.).  Recent Labs     05/20/24 2147 05/21/24 0211 05/22/24  0157   GLUCOSE 87 114* 83     Recent Labs     05/20/24 2147 05/21/24 0211 05/22/24  0157   ASTSGOT 15 14 13   ALTSGPT 9 7 7   TBILIRUBIN 0.7 0.7 0.3   ALKPHOSPHAT 73 64 55   GLOBULIN 2.7 2.6 2.5       IMAGING:  CT-CTA COMPLETE THORACOABDOMINAL AORTA   Final Result         1.  4.1 cm infrarenal abdominal aortic aneurysm with stent graft in place.   2.  4.4 x 4.0 cm fusiform distal thoracic aortic aneurysm, radiographic follow-up and surveillance recommended as clinically appropriate.   3.  Emphysema   4.  Cholelithiasis   5.  Atherosclerosis and atherosclerotic coronary artery disease      OUTSIDE IMAGES-CT CHEST   Final Result      OUTSIDE IMAGES-CT ABDOMEN /PELVIS   Final Result      EC-ECHOCARDIOGRAM COMPLETE W/O CONT    (Results Pending)       CULTURES:   Results       Procedure Component Value Units Date/Time    CULTURE STOOL [[id number]]     Order Status: No result Specimen: Stool     C Diff by PCR rflx Toxin [[id number]]     Order Status: No result Specimen: Stool     BLOOD CULTURE [[id number]] Collected: 05/20/24 2147    Order Status: Completed Specimen: Blood from Peripheral Updated: 05/21/24 0641     Significant Indicator NEG     Source BLD     Site PERIPHERAL     Culture Result No Growth  Note: Blood cultures are incubated for 5 days and  are monitored continuously.Positive blood cultures  are called to the RN and reported as soon as  they are identified.      Narrative:      Left AC    BLOOD CULTURE [[id number]] Collected: 05/20/24 2147    Order Status: Completed Specimen: Blood from Peripheral Updated: 05/21/24 0641     Significant Indicator NEG     Source BLD      Site PERIPHERAL     Culture Result No Growth  Note: Blood cultures are incubated for 5 days and  are monitored continuously.Positive blood cultures  are called to the RN and reported as soon as  they are identified.      Narrative:      Right AC            MEDS:  Current Facility-Administered Medications   Medication Last Admin    Pharmacy Consult Request - C. difficile consult      cefUROxime (Zinacef) 750 mg in  mL IVPB Stopped at 05/22/24 0605    metroNIDAZOLE (Flagyl) IVPB 500 mg Stopped at 05/22/24 0531    [START ON 5/28/2024] alendronate (Fosamax) tablet 70 mg      cyanocobalamin (Vitamin B-12) tablet 1,000 mcg 1,000 mcg at 05/22/24 0427    donepezil (Aricept) tablet 5 mg 5 mg at 05/21/24 2113    doxepin (SINEquan) capsule 10 mg 10 mg at 05/21/24 2116    pantoprazole (Protonix) injection 40 mg 40 mg at 05/22/24 0427    acetaminophen (Tylenol) tablet 650 mg         ASSESSMENT/PLAN:  78 y.o. male with hx of left infrarenal AAA (s/p stent graft 2013) admitted for ischemic bowel disease and left renal infarct    #Ischemic Bowel Disease  Patient with left upper quadrant and bright red blood per rectum x3 days  CTA of abdomen/pelvis with severe wall thickening in the sigmoid colon and rectum consistent with colitis and proctitis. This could be due to infectious vs. Inflammatory vs ischemic etiology. JOHNY not clearly seen  Follow up CTA-Chest with patient celiac, SMA, and JOHNY. No dissection.  Continue Pantoprazole 40 mg IV BID  H&H stable this AM, will monitor q1day  WBC previously 17 at outlying facility, improved to 9.9 this AM  Will switch to PO Flagyl and Cefuroxime per pharmacy  General Surgery consulted and no surgical intervention  Continue to monitor with abdominal exams    #GI Bleed  Continue Pantoprazole 40 mg IV BID  H&H stable this AM at 11.9 and 36.2 this AM, will monitor q1day  Dr. Mendosa (GI) consulted and recommends stool culture and C. Difficile testing  Still waiting for patient to produce  stool sample, as patient flushed 2 prior bowel movements last night  If stood studies negative, can consider flexible sigmoidoscopy for biopsies vs full colonoscopy  Patient will remain on full liquid diet until testing has returned    #Renal Infarct of Left Kidney  CTA of Abdomen/Pelvis showing 20% ischemia or partial infarction of left kidney  Vascular consulted and has no indications for emergent surgical intervention or anticoagulation  Vascular believes this is secondary to stent graft, but recommends follow up outpatient for surveillance with whoever placed the stent graft  Vascular does recommends Echo, still pending    #AAA  CTA-chest with 4.1 cm infrarenal AAA with stent  Stable from December 2023  Continue surveillance monitoring outpatient    Core Measures:  Fluids: None  Lines: Peripheral IV  Abx: PO Flagyl and Cefuroxime   Diet: Full Liquid  PPX: pharmacologic prophylaxis contraindicated due to GI bleed    CODE Status: Full Code      Disposition  Patient is not medically cleared for discharge.   Anticipate discharge to to skilled nursing facility.  I have placed the appropriate orders for post-discharge needs.      Nam Dumont, MS3  [employer]

## 2024-05-22 NOTE — DISCHARGE PLANNING
DPA called Yasmeen with [facility], pt does not have Medicare so will be a denial by [facility].

## 2024-05-22 NOTE — PROGRESS NOTES
"  DATE: 5/22/2024    Hospital Day 2  colitis    INTERVAL EVENTS:  WBC normalized  Tolerating fulls, hungry  + BM overnight    REVIEW OF SYSTEMS:  Review of systems is remarkable for the following abdominal pain. The remainder of the comprehensive ROS is negative with the exception of the aforementioned HPI, PMH, and PSH bullets in accordance with CMS guideline.    PHYSICAL EXAMINATION:  Vital Signs: /53   Pulse 64   Temp 36.7 °C (98.1 °F) (Temporal)   Resp 18   Ht 1.676 m (5' 6\")   Wt 46.3 kg (102 lb 1.2 oz)   SpO2 98%   Physical Exam  Vitals reviewed.   Constitutional:       Appearance: He is not toxic-appearing.   HENT:      Mouth/Throat:      Mouth: Mucous membranes are moist.      Pharynx: Oropharynx is clear.   Eyes:      Conjunctiva/sclera: Conjunctivae normal.   Pulmonary:      Effort: Pulmonary effort is normal.   Abdominal:      General: There is no distension.      Palpations: Abdomen is soft.      Tenderness: There is abdominal tenderness (minimal, low abdomen).   Neurological:      Mental Status: He is alert.         LABORATORY VALUES:  Recent Labs     05/20/24 2147 05/21/24 0211 05/21/24  0641 05/21/24 2246 05/22/24  0157 05/22/24  0626   WBC 14.3* 13.5*  --   --  9.9  --    RBC 4.06* 3.78*  --   --  3.50*  --    HEMOGLOBIN 12.9* 12.0*   < > 10.6* 11.0* 11.9*   HEMATOCRIT 38.2* 35.4*   < > 32.2* 32.8* 36.2*   MCV 94.1 93.7  --   --  93.7  --    MCH 31.8 31.7  --   --  31.4  --    MCHC 33.8 33.9  --   --  33.5  --    RDW 45.5 44.5  --   --  45.2  --    PLATELETCT 198 192  --   --  189  --    MPV 11.3 11.5  --   --  11.2  --     < > = values in this interval not displayed.     Recent Labs     05/20/24 2147 05/21/24 0211 05/22/24  0157   SODIUM 142 143 141   POTASSIUM 3.8 3.9 3.8   CHLORIDE 109 112 111   CO2 23 21 20   GLUCOSE 87 114* 83   BUN 16 15 11   CREATININE 0.87 0.69 0.62   CALCIUM 8.3* 7.7* 7.8*     Recent Labs     05/20/24  2147 05/21/24  0211 05/22/24  0157   ASTSGOT 15 14 13 "   ALTSGPT 9 7 7   TBILIRUBIN 0.7 0.7 0.3   ALKPHOSPHAT 73 64 55   GLOBULIN 2.7 2.6 2.5           IMAGING:  CT-CTA COMPLETE THORACOABDOMINAL AORTA   Final Result         1.  4.1 cm infrarenal abdominal aortic aneurysm with stent graft in place.   2.  4.4 x 4.0 cm fusiform distal thoracic aortic aneurysm, radiographic follow-up and surveillance recommended as clinically appropriate.   3.  Emphysema   4.  Cholelithiasis   5.  Atherosclerosis and atherosclerotic coronary artery disease      OUTSIDE IMAGES-CT CHEST   Final Result      OUTSIDE IMAGES-CT ABDOMEN /PELVIS   Final Result      EC-ECHOCARDIOGRAM COMPLETE W/O CONT    (Results Pending)       ASSESSMENT AND PLAN:  Advance to GI soft diet        ____________________________________     SULMA Russell    DD: 5/22/2024  10:00 AM

## 2024-05-22 NOTE — FACE TO FACE
Face to Face Supporting Documentation - Home Health    The encounter with this patient was in whole or in part the primary reason for home health admission.    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 Leaving home requires a considerable and taxing effort. There is a normal inability to leave the home.    Community Physician to provide follow up care: Klaudia Arana M.D.     Optional Interventions? No      I certify the face to face encounter for this home health care referral meets the CMS requirements and the encounter/clinical assessment with the patient was, in whole, or in part, for the medical condition(s) listed above, which is the primary reason for home health care. Based on my clinical findings: the service(s) are medically necessary, support the need for home health care, and the homebound criteria are met.  I certify that this patient has had a face to face encounter by myself.  Linda Novak M.D. - NPI: [id number]

## 2024-05-22 NOTE — PROGRESS NOTES
[facility] Internal Medicine Daily Progress Note    Date of Service  5/22/2024    [facility] Team: R IM Green Team   Attending: Nick Edmonds M.d.  Senior Resident: Dr. Izaguirre  Intern:  Dr. Novak  Contact Number: [phone]    Chief Complaint  Indio Cohen is a 78 y.o. male admitted 5/20/2024 with abdominal pain and bright red blood per rectum.    Hospital Course  Indio Cohen is a 78 y.o. male who presented on 5/20/2024 as a transfer from outside facility.  Patient initially presented to outside facility complaining of abdominal pain and bright red blood per rectum.  Stated that the pain and bloody bowel movements were going on for 2 to 3 days now.  Denies any fevers, chills, nausea, vomiting.  He states that the pain is worse in the left upper quadrant.  CT angiography of the abdomen pelvis with IV contrast was obtained.  There is severe wall thickening the sigmoid colon and rectum consistent with colitis and proctitis. This could be due to infectious versus inflammatory versus ischemic etiology.  Inferior mesenteric artery is not clearly seen.  There is a 4.1 cm abdominal aortic aneurysm with stent graft present that appears to be stable compared to December 2023.  There is a patch uptake of contrast in the posterior aspect of the left kidney extending into the upper pole of the left kidney.  This could be due to ischemia or partial infarction approximately 20% of the left kidney.   CT scan of the chest shows moderate emphysematous changes.  Coronary artery disease.  There is moderate size hiatal hernia.  4.8 mm nodule at the left lung base. Labs were notable for leukocytosis greater than 17,000.  Hemoglobin appears to be stable at 14.  He had a creatinine level of 1.32, which appears to be near his baseline.  Lactic acid was 1.3.  Urinalysis unremarkable.   Case is been discussed with both general surgery and vascular surgery.  No recommended surgical intervention at this time.  GI consulted: Order is not  stool culture, C. difficile and start full liquid diet. If stool studies are negative consider flexible sigmoidoscopy for biopsies versus full colonoscopy based on patient's clinical course.         Interval Problem Update  No acute events overnight.  Patient was seen and evaluated at bedside this a.m., denies abdominal pain.  Denies nausea, vomiting, chest pain or shortness of breath.  Patient denies bowel movements, per nursing staff patient had 2 bowel movements yesterday but were not consistent to collect sample. GI consulted: Ordered stool culture, C. difficile and start full liquid diet. If stool studies are negative consider flexible sigmoidoscopy for biopsies versus full colonoscopy based on patient's clinical course.  Still pending stool sample.  PT OT recommended home health but per  is no [facility].  Discussed with son of the patient about considering a SNF but he refers that patient does not want it.    I have discussed this patient's plan of care and discharge plan at IDT rounds today with Case Management, Nursing, Nursing leadership, and other members of the IDT team.    Consultants/Specialty  GI  General surgery  Vascular surgery    Code Status  Full Code    Disposition  The patient is not medically cleared for discharge to home or a post-acute facility.      I have placed the appropriate orders for post-discharge needs.    Review of Systems  Review of Systems   Constitutional:  Negative for chills, fever, malaise/fatigue and weight loss.   HENT:  Negative for congestion and sore throat.    Eyes:  Negative for blurred vision and double vision.   Respiratory:  Negative for cough, sputum production and shortness of breath.    Cardiovascular:  Negative for chest pain, palpitations and leg swelling.   Gastrointestinal:  Positive for abdominal pain (Left-sided). Negative for constipation, diarrhea, nausea and vomiting.   Genitourinary:  Negative for dysuria.   Musculoskeletal:  Negative  for back pain, joint pain and myalgias.   Neurological:  Negative for dizziness, weakness and headaches.   Psychiatric/Behavioral:  Negative for substance abuse. The patient is not nervous/anxious.         Physical Exam  Temp:  [36.7 °C (98.1 °F)-37 °C (98.6 °F)] 36.7 °C (98.1 °F)  Pulse:  [64-81] 64  Resp:  [16-20] 18  BP: (119-137)/(53-72) 119/53  SpO2:  [89 %-98 %] 98 %    Physical Exam  Constitutional:       General: He is not in acute distress.     Appearance: Normal appearance. He is not ill-appearing.   HENT:      Head: Normocephalic and atraumatic.      Nose: Nose normal.      Mouth/Throat:      Mouth: Mucous membranes are moist.   Eyes:      Extraocular Movements: Extraocular movements intact.      Conjunctiva/sclera: Conjunctivae normal.      Pupils: Pupils are equal, round, and reactive to light.   Cardiovascular:      Rate and Rhythm: Normal rate and regular rhythm.      Pulses: Normal pulses.      Heart sounds: Normal heart sounds.   Pulmonary:      Effort: Pulmonary effort is normal. No respiratory distress.      Breath sounds: Normal breath sounds.   Chest:      Chest wall: No tenderness.   Abdominal:      General: Bowel sounds are normal. There is no distension.      Palpations: Abdomen is soft.      Tenderness: There is abdominal tenderness (Left-sided). There is no right CVA tenderness or left CVA tenderness.   Musculoskeletal:         General: No swelling or tenderness. Normal range of motion.      Cervical back: Normal range of motion and neck supple.      Right lower leg: No edema.      Left lower leg: No edema.   Skin:     General: Skin is warm.      Capillary Refill: Capillary refill takes less than 2 seconds.      Coloration: Skin is not jaundiced or pale.   Neurological:      General: No focal deficit present.      Mental Status: He is alert and oriented to person, place, and time.      Cranial Nerves: No cranial nerve deficit.      Sensory: No sensory deficit.      Motor: No weakness.    Psychiatric:         Mood and Affect: Mood normal.         Fluids    Intake/Output Summary (Last 24 hours) at 5/22/2024 1233  Last data filed at 5/22/2024 0800  Gross per 24 hour   Intake 2330.47 ml   Output 875 ml   Net 1455.47 ml       Laboratory  Recent Labs     05/20/24 2147 05/21/24  0211 05/21/24  0641 05/22/24  0157 05/22/24  0626 05/22/24  1025   WBC 14.3* 13.5*  --  9.9  --   --    RBC 4.06* 3.78*  --  3.50*  --   --    HEMOGLOBIN 12.9* 12.0*   < > 11.0* 11.9* 12.3*   HEMATOCRIT 38.2* 35.4*   < > 32.8* 36.2* 36.6*   MCV 94.1 93.7  --  93.7  --   --    MCH 31.8 31.7  --  31.4  --   --    MCHC 33.8 33.9  --  33.5  --   --    RDW 45.5 44.5  --  45.2  --   --    PLATELETCT 198 192  --  189  --   --    MPV 11.3 11.5  --  11.2  --   --     < > = values in this interval not displayed.     Recent Labs     05/20/24 2147 05/21/24 0211 05/22/24  0157   SODIUM 142 143 141   POTASSIUM 3.8 3.9 3.8   CHLORIDE 109 112 111   CO2 23 21 20   GLUCOSE 87 114* 83   BUN 16 15 11   CREATININE 0.87 0.69 0.62   CALCIUM 8.3* 7.7* 7.8*             Recent Labs     05/21/24 0211   TRIGLYCERIDE 67   HDL 41   LDL 37       Imaging  CT-CTA COMPLETE THORACOABDOMINAL AORTA   Final Result         1.  4.1 cm infrarenal abdominal aortic aneurysm with stent graft in place.   2.  4.4 x 4.0 cm fusiform distal thoracic aortic aneurysm, radiographic follow-up and surveillance recommended as clinically appropriate.   3.  Emphysema   4.  Cholelithiasis   5.  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  There is severe wall  thickening the sigmoid colon and rectum consistent with colitis and proctitis.  This could be due to infectious versus inflammatory versus ischemic etiology.  Inferior mesenteric artery is not clearly seen.    -Pantoprazole 40mg IV BID  -H/H [id number]   -Transfuse for hemoglobin less than 7  -Empirically started the patient on IV Zosyn  -Switching to cefuroxime plus metronidazole, per ID Pharm recommendations  -FOBT pending  -General Surgery consulted: No surgical intervention at this point   -Vascular surgery consulted: No concerns about renal infarct. Do not recommend systemic anticoagulation in this patient for that indication and did not recommend any procedure at this time. Recommend getting an echocardiogram to rule out thromboembolic status along with the CTA of his chest as well  -GI consulted: Order is not stool culture, C. difficile and start full liquid diet. If stool studies are negative consider flexible sigmoidoscopy for biopsies versus full colonoscopy based on patient's clinical course.  Still pending stool sample.    Advanced care planning/counseling discussion  Assessment & Plan  I spent 17 minutes at bedside with nursing staff present with patient discussing work-up, results, diagnosis, prognosis. Talked to patient about his COPD, severe protein calorie malnutrition, hx of AAA, now presenting with ischemic bowel/infectious and a renal infarct    CODE STATUS discussed with patient and wants to be Full Code   - PT OT recommended home health but per  is no [facility].  Discussed with son of the patient about considering a SNF but he refers that patient does not want it.      Renal infarct (HCC)  Assessment & Plan  There is a patch uptake of contrast in the posterior aspect of the left kidney extending into the upper pole of the left kidney.  This could be due to ischemia or partial infarction approximately 20% of the left kidney.  Vascular surgery has been consulted.  No indication for  any emergent surgical intervention  Further discussion was had with vascular surgeon.  He does not need anticoagulation from the renal infarct perspective.  This all likely secondary to the placement of the stent graft.  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  His SMA appears to be okay.  Recommending management for his GI bleed versus colitis.  Patient will need follow-up with whoever placed the stent graft for surveillance.   He did recommend getting an echocardiogram to rule out thromboembolic status along with the CTA of his chest as well  -Echo pending  -CTA of the chest wall remarkable for 4.1 cm infrarenal abdominal aortic aneurysm with stent graft in place, 4.4 x 4.0 cm fusiform distal thoracic aortic aneurysm, radiographic follow-up and surveillance recommended as clinically appropriate. Emphysema. Cholelithiasis. Atherosclerosis and atherosclerotic coronary artery disease.       GI bleed  Assessment & Plan  Patient states he is happy  Blood per rectum for last 2 to 3 days.'s likely secondary to the colitis versus proctitis seen on CT imaging  -Pantoprazole 40 mg IV twice daily  -Hemoglobin hematocrit every 4 hours  -Transfuse for hemoglobin less than 7  -GI consulted: Order is not stool culture, C. difficile and start full liquid diet. If stool studies are negative consider flexible sigmoidoscopy for biopsies versus full colonoscopy based on patient's clinical course.     Severe protein-calorie malnutrition (HCC)  Assessment & Plan  BMI 15.91  -Nutrition consult placed    Smoking- (present on admission)  Assessment & Plan  Patient smokes < 1PPD.  Nicotine patch and/or gum offered.   I discussed cessation with patient including starting on nicotine patch and/or gum on discharge.  I also discussed medications to help with cessation with patient including Wellbutrin and Chantix, offered .  Smoking cessation discussed with patient for 5 minutes.      AAA (abdominal  aortic aneurysm) (HCC)- (present on admission)  Assessment & Plan  History of 4.1cm AAA seen on CTA.   -Graft in place   -Appears to be stable   -No hemodynamically instability         VTE prophylaxis: SCDs/TEDs    I have performed a physical exam and reviewed and updated ROS and Plan today (5/22/2024). In review of yesterday's note (5/21/2024), there are no changes except as documented above.    Linda Novak MD  Internal Medicine PGY-1

## 2024-05-22 NOTE — PROGRESS NOTES
..Gastroenterology Progress Note               Author:  Jessie Byrd, ALEJANDRO,  APRN Date & Time Created: 5/22/2024 7:15 AM       Patient ID:  Name:             Indio Cohen  YOB: 1945  Age:                 78 y.o.  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  Patient has been having this process going on for 3 days.  No bloody bowel movements overnight.  No fevers.  No sick contacts.  This is never happened before.  Patient has CT scan demonstrating colitis in the rectosigmoid region.  Patient denies history of colonoscopy.  No new medications.  Patient has not been on antibiotics recently.  Patient denies history of colonoscopy.  Patient has a history of AAA repair.  He has an endograft in place which appears stable.  Patient denies unintentional weight loss.  No hematemesis.  He is nauseous and has problems with vomiting.    Interval History:  5/22/2024: patient seen. Feels weak, tired, and reports all over pain. Per RN had 2 BM overnight however patient removed hat so sample was not taken. Discussed with patient that we need a sample. Hgb stable    Hospital Medications:  Current Facility-Administered Medications   Medication Dose Frequency Provider Last Rate Last Admin    Pharmacy Consult Request - C. difficile consult  1 Each PHARMACY TO DOSE Ben Mendosa M.D.        cefUROxime (Zinacef) 750 mg in  mL IVPB  750 mg Q8HRS Linda Novak M.D.   Stopped at 05/22/24 0605     "metroNIDAZOLE (Flagyl) IVPB 500 mg  500 mg Q12HRS Linda Novak M.D.   Stopped at 05/22/24 0531    [START ON 5/28/2024] alendronate (Fosamax) tablet 70 mg  70 mg Q7 DAYS Linda Novak M.D.        cyanocobalamin (Vitamin B-12) tablet 1,000 mcg  1,000 mcg DAILY Linda Novak M.D.   1,000 mcg at 05/22/24 0427    donepezil (Aricept) tablet 5 mg  5 mg Nightly Linda Novak M.D.   5 mg at 05/21/24 2113    doxepin (SINEquan) capsule 10 mg  10 mg QHS PRN Linda Novak M.D.   10 mg at 05/21/24 2116    pantoprazole (Protonix) injection 40 mg  40 mg BID Mario Frost M.D.   40 mg at 05/22/24 0427    acetaminophen (Tylenol) tablet 650 mg  650 mg Q6HRS PRN Mario Frost M.D.       Last reviewed on 5/21/2024  9:22 AM by Ora Silver, Pharmacy Intern       Review of Systems:  Review of Systems   Constitutional:  Positive for malaise/fatigue. Negative for chills and fever.   HENT:  Negative for hearing loss.    Eyes:  Negative for blurred vision.   Respiratory:  Negative for cough and shortness of breath.    Cardiovascular:  Negative for chest pain and leg swelling.   Gastrointestinal:  Positive for abdominal pain. Negative for blood in stool, constipation, diarrhea, heartburn, melena, nausea and vomiting.   Genitourinary:  Negative for dysuria.   Musculoskeletal:  Positive for back pain.   Skin:  Negative for rash.   Neurological:  Positive for weakness. Negative for dizziness.   Psychiatric/Behavioral:  Negative for depression.    All other systems reviewed and are negative.        Vital signs:  Weight/BMI: Body mass index is 16.48 kg/m².  /53   Pulse 64   Temp 36.7 °C (98.1 °F) (Temporal)   Resp 18   Ht 1.676 m (5' 6\")   Wt 46.3 kg (102 lb 1.2 oz)   SpO2 98%   Vitals:    05/21/24 1700 05/21/24 2353 05/22/24 0300 05/22/24 0713   BP:  124/67 137/72 119/53   Pulse: 71 68 66 64   Resp:  18 20 18   Temp:  36.9 °C (98.4 °F) 37 °C (98.6 °F) 36.7 °C (98.1 °F)   TempSrc:  Temporal Temporal Temporal   SpO2: " 98% 96% 97% 98%   Weight:   46.3 kg (102 lb 1.2 oz)    Height:         Oxygen Therapy:  Pulse Oximetry: 98 %, O2 (LPM): 0, O2 Delivery Device: None - Room Air    Intake/Output Summary (Last 24 hours) at 5/22/2024 0715  Last data filed at 5/22/2024 0605  Gross per 24 hour   Intake 1930.47 ml   Output 875 ml   Net 1055.47 ml         Physical Exam:  Physical Exam  Vitals and nursing note reviewed.   Constitutional:       General: He is not in acute distress.     Appearance: He is ill-appearing.      Comments: Frail thin elderly male   HENT:      Head: Normocephalic and atraumatic.      Right Ear: External ear normal.      Left Ear: External ear normal.      Nose: Nose normal.      Mouth/Throat:      Mouth: Mucous membranes are moist.      Pharynx: Oropharynx is clear.      Comments: Poor dentition  Eyes:      General: No scleral icterus.  Cardiovascular:      Rate and Rhythm: Normal rate and regular rhythm.      Pulses: Normal pulses.      Heart sounds: Normal heart sounds.   Pulmonary:      Effort: Pulmonary effort is normal.      Breath sounds: Rales present.   Abdominal:      General: Abdomen is flat. Bowel sounds are normal. There is no distension.      Palpations: Abdomen is soft.      Tenderness: There is abdominal tenderness.   Musculoskeletal:         General: Normal range of motion.      Cervical back: Normal range of motion and neck supple.   Skin:     General: Skin is warm and dry.      Capillary Refill: Capillary refill takes less than 2 seconds.      Coloration: Skin is pale.   Neurological:      Mental Status: He is alert and oriented to person, place, and time.      Motor: Weakness present.   Psychiatric:         Mood and Affect: Mood normal.         Behavior: Behavior normal.             Labs:  Recent Labs     05/20/24  2147 05/21/24  0211 05/22/24  0157   SODIUM 142 143 141   POTASSIUM 3.8 3.9 3.8   CHLORIDE 109 112 111   CO2 23 21 20   BUN 16 15 11   CREATININE 0.87 0.69 0.62   MAGNESIUM 2.0  --  2.0    PHOSPHORUS 2.5  --   --    CALCIUM 8.3* 7.7* 7.8*     Recent Labs     05/20/24 2147 05/21/24 0211 05/22/24  0157   ALTSGPT 9 7 7   ASTSGOT 15 14 13   ALKPHOSPHAT 73 64 55   TBILIRUBIN 0.7 0.7 0.3   GLUCOSE 87 114* 83     Recent Labs     05/20/24 2147 05/21/24 0211 05/22/24  0157   WBC 14.3* 13.5* 9.9   NEUTSPOLYS 74.20*  --  65.70   LYMPHOCYTES 14.70*  --  20.10*   MONOCYTES 8.90  --  10.30   EOSINOPHILS 1.30  --  2.90   BASOPHILS 0.60  --  0.70   ASTSGOT 15 14 13   ALTSGPT 9 7 7   ALKPHOSPHAT 73 64 55   TBILIRUBIN 0.7 0.7 0.3     Recent Labs     05/20/24 2147 05/21/24 0211 05/21/24  0641 05/21/24 2246 05/22/24 0157 05/22/24  0626   RBC 4.06* 3.78*  --   --  3.50*  --    HEMOGLOBIN 12.9* 12.0*   < > 10.6* 11.0* 11.9*   HEMATOCRIT 38.2* 35.4*   < > 32.2* 32.8* 36.2*   PLATELETCT 198 192  --   --  189  --     < > = values in this interval not displayed.     Recent Results (from the past 24 hour(s))   LACTIC ACID    Collection Time: 05/21/24  9:55 AM   Result Value Ref Range    Lactic Acid 1.1 0.5 - 2.0 mmol/L   HEMOGLOBIN AND HEMATOCRIT    Collection Time: 05/21/24  9:55 AM   Result Value Ref Range    Hemoglobin 12.4 (L) 14.0 - 18.0 g/dL    Hematocrit 37.8 (L) 42.0 - 52.0 %   HEMOGLOBIN AND HEMATOCRIT    Collection Time: 05/21/24  1:32 PM   Result Value Ref Range    Hemoglobin 11.8 (L) 14.0 - 18.0 g/dL    Hematocrit 37.1 (L) 42.0 - 52.0 %   HEMOGLOBIN AND HEMATOCRIT    Collection Time: 05/21/24 10:46 PM   Result Value Ref Range    Hemoglobin 10.6 (L) 14.0 - 18.0 g/dL    Hematocrit 32.2 (L) 42.0 - 52.0 %   Comp Metabolic Panel    Collection Time: 05/22/24  1:57 AM   Result Value Ref Range    Sodium 141 135 - 145 mmol/L    Potassium 3.8 3.6 - 5.5 mmol/L    Chloride 111 96 - 112 mmol/L    Co2 20 20 - 33 mmol/L    Anion Gap 10.0 7.0 - 16.0    Glucose 83 65 - 99 mg/dL    Bun 11 8 - 22 mg/dL    Creatinine 0.62 0.50 - 1.40 mg/dL    Calcium 7.8 (L) 8.5 - 10.5 mg/dL    Correct Calcium 8.6 8.5 - 10.5 mg/dL     AST(SGOT) 13 12 - 45 U/L    ALT(SGPT) 7 2 - 50 U/L    Alkaline Phosphatase 55 30 - 99 U/L    Total Bilirubin 0.3 0.1 - 1.5 mg/dL    Albumin 3.0 (L) 3.2 - 4.9 g/dL    Total Protein 5.5 (L) 6.0 - 8.2 g/dL    Globulin 2.5 1.9 - 3.5 g/dL    A-G Ratio 1.2 g/dL   MAGNESIUM    Collection Time: 05/22/24  1:57 AM   Result Value Ref Range    Magnesium 2.0 1.5 - 2.5 mg/dL   CBC WITH DIFFERENTIAL    Collection Time: 05/22/24  1:57 AM   Result Value Ref Range    WBC 9.9 4.8 - 10.8 K/uL    RBC 3.50 (L) 4.70 - 6.10 M/uL    Hemoglobin 11.0 (L) 14.0 - 18.0 g/dL    Hematocrit 32.8 (L) 42.0 - 52.0 %    MCV 93.7 81.4 - 97.8 fL    MCH 31.4 27.0 - 33.0 pg    MCHC 33.5 32.3 - 36.5 g/dL    RDW 45.2 35.9 - 50.0 fL    Platelet Count 189 164 - 446 K/uL    MPV 11.2 9.0 - 12.9 fL    Neutrophils-Polys 65.70 44.00 - 72.00 %    Lymphocytes 20.10 (L) 22.00 - 41.00 %    Monocytes 10.30 0.00 - 13.40 %    Eosinophils 2.90 0.00 - 6.90 %    Basophils 0.70 0.00 - 1.80 %    Immature Granulocytes 0.30 0.00 - 0.90 %    Nucleated RBC 0.00 0.00 - 0.20 /100 WBC    Neutrophils (Absolute) 6.51 1.82 - 7.42 K/uL    Lymphs (Absolute) 1.99 1.00 - 4.80 K/uL    Monos (Absolute) 1.02 (H) 0.00 - 0.85 K/uL    Eos (Absolute) 0.29 0.00 - 0.51 K/uL    Baso (Absolute) 0.07 0.00 - 0.12 K/uL    Immature Granulocytes (abs) 0.03 0.00 - 0.11 K/uL    NRBC (Absolute) 0.00 K/uL   ESTIMATED GFR    Collection Time: 05/22/24  1:57 AM   Result Value Ref Range    GFR (CKD-EPI) 97 >60 mL/min/1.73 m 2   HEMOGLOBIN AND HEMATOCRIT    Collection Time: 05/22/24  6:26 AM   Result Value Ref Range    Hemoglobin 11.9 (L) 14.0 - 18.0 g/dL    Hematocrit 36.2 (L) 42.0 - 52.0 %       Radiology Review:  CT-CTA COMPLETE THORACOABDOMINAL AORTA   Final Result         1.  4.1 cm infrarenal abdominal aortic aneurysm with stent graft in place.   2.  4.4 x 4.0 cm fusiform distal thoracic aortic aneurysm, radiographic follow-up and surveillance recommended as clinically appropriate.   3.  Emphysema   4.   Cholelithiasis   5.  Atherosclerosis and atherosclerotic coronary artery disease      OUTSIDE IMAGES-CT CHEST   Final Result      OUTSIDE IMAGES-CT ABDOMEN /PELVIS   Final Result      EC-ECHOCARDIOGRAM COMPLETE W/O CONT    (Results Pending)         MDM (Data Review):   -Records reviewed and summarized in current documentation  -I personally reviewed and interpreted the laboratory results  -I personally reviewed the radiology images    Assessment/Recommendations:  Hematochezia  Proctocolitis  Vasculopathy with aortic stent graft  ?ischemic vs infectious colitis vs IBD    Recommendations:  ---Stool culture and c. diff pending - discussed with patient that we require a stool sample the next time he has a BM  ---If stool studies are negative consider flexible sigmoidoscopy for biopsies versus full colonoscopy based on patient's clinical course.  Decisions regarding endoscopic evaluation deferred at this time.  ---Full liquid diet.      GI will follow.    Discussed with patient, RN, primary team, and Dr. Sibley    Core Quality Measures   Reviewed items::  Labs, Medications and Radiology reports reviewed

## 2024-05-22 NOTE — CARE PLAN
The patient is Stable - Low risk of patient condition declining or worsening    Shift Goals  Clinical Goals: iv abx, monitor vs  Patient Goals: rest  Family Goals: updates    Progress made toward(s) clinical / shift goals:    Problem: Psychosocial  Goal: Patient's level of anxiety will decrease  Description: Target End Date:  1-3 days or as soon as patient condition allows    1.  Collaborate with patient and family/caregiver to identify triggers and develop strategies to cope with anxiety  2.  Implement stimuli reduction, calming techniques  3.  Pharmacologic management per provider order  4.  Encourage patient/family/care giver participation  5.  Collaborate with interdisciplinary team including Psychologist or Behavioral Health Team as needed  5/21/2024 1833 by Nenita Waldrop R.N.  Outcome: Progressing  5/21/2024 1816 by Nenita Waldrop R.N.  Outcome: Progressing     Problem: Communication  Goal: The ability to communicate needs accurately and effectively will improve  Description: Target End Date:  End of day 1    1.  Assess ability to communicate and understand  2.  Provide augmentative or alternative methods of communication devices  3.  Use /language line as appropriate  4.  Collaborate with Speech Therapy as needed  5/21/2024 1833 by Nenita Waldrop R.N.  Outcome: Progressing  5/21/2024 1816 by Nenita Waldrop R.N.  Outcome: Progressing     Problem: Hemodynamics  Goal: Patient's hemodynamics, fluid balance and neurologic status will be stable or improve  Description: Target End Date:  Prior to discharge or change in level of care    Document on Assessment and I/O flowsheet templates    1.  Monitor vital signs, pulse oximetry and cardiac monitor per provider order and/or policy  2.  Maintain blood pressure per provider order  3.  Hemodynamic monitoring per provider order  4.  Manage IV fluids and IV infusions  5.  Monitor intake and output  6.  Daily weights per unit policy or provider  order  7.  Assess peripheral pulses and capillary refill  8.  Assess color and body temperature  9.  Position patient for maximum circulation/cardiac output  10. Monitor for signs/symptoms of excessive bleeding  11. Assess mental status, restlessness and changes in level of consciousness  12. Monitor temperature and report fever or hypothermia to provider immediately. Consideration of targeted temperature management.  Outcome: Progressing       Patient is not progressing towards the following goals:      Problem: Knowledge Deficit - Standard  Goal: Patient and family/care givers will demonstrate understanding of plan of care, disease process/condition, diagnostic tests and medications  Description: Target End Date:  1-3 days or as soon as patient condition allows    Document in Patient Education    1.  Patient and family/caregiver oriented to unit, equipment, visitation policy and means for communicating concern  2.  Complete/review Learning Assessment  3.  Assess knowledge level of disease process/condition, treatment plan, diagnostic tests and medications  4.  Explain disease process/condition, treatment plan, diagnostic tests and medications  Outcome: Not Met

## 2024-05-22 NOTE — CARE PLAN
The patient is Stable - Low risk of patient condition declining or worsening    Shift Goals  Clinical Goals: iv abx, stool sample  Patient Goals: solid food  Family Goals: CHRISTOFER    Progress made toward(s) clinical / shift goals:    Problem: Psychosocial  Goal: Patient's level of anxiety will decrease  Description: Target End Date:  1-3 days or as soon as patient condition allows    1.  Collaborate with patient and family/caregiver to identify triggers and develop strategies to cope with anxiety  2.  Implement stimuli reduction, calming techniques  3.  Pharmacologic management per provider order  4.  Encourage patient/family/care giver participation  5.  Collaborate with interdisciplinary team including Psychologist or Behavioral Health Team as needed  Outcome: Progressing     Problem: Communication  Goal: The ability to communicate needs accurately and effectively will improve  Description: Target End Date:  End of day 1    1.  Assess ability to communicate and understand  2.  Provide augmentative or alternative methods of communication devices  3.  Use /language line as appropriate  4.  Collaborate with Speech Therapy as needed  Outcome: Progressing     Problem: Mobility  Goal: Patient's capacity to carry out activities will improve  Description: Target End Date:  Prior to discharge or change in level of care    1.  Assess for barriers to mobility/activity  2.  Implement activity per interdisciplinary team recommendations  3.  Target activity level identified and patient/family/caregiver aware of goal  4.  Provide assistive devices  5.  Instruct patient/caregiver on proper use of assistive/adaptive devices  6.  Schedule activities and rest periods to decrease effects of fatigue  7.  Encourage mobilization to extent of ability  8.  Maintain proper body alignment  9.  Provide adequate pain management to allow progressive mobilization  10. Implement pace maker precautions as needed  Outcome: Progressing        Patient is not progressing towards the following goals:

## 2024-05-22 NOTE — CARE PLAN
The patient is Stable - Low risk of patient condition declining or worsening    Shift Goals  Clinical Goals: IV abx, monitor H&H, monitor stool, stool sample, safety, comfort  Patient Goals: rest  Family Goals: CHRISTOFER    Progress made toward(s) clinical / shift goals:      IV abx infused per orders.   Patient hemoglobin stabalizing with current 11.0 per AM labs  Scant blood noted in last BM upon wiping this morning, only on toilet paper, not present in stool or toilet.     Problem: Knowledge Deficit - Standard  Goal: Patient and family/care givers will demonstrate understanding of plan of care, disease process/condition, diagnostic tests and medications  Description: Target End Date:  1-3 days or as soon as patient condition allows    Document in Patient Education    1.  Patient and family/caregiver oriented to unit, equipment, visitation policy and means for communicating concern  2.  Complete/review Learning Assessment  3.  Assess knowledge level of disease process/condition, treatment plan, diagnostic tests and medications  4.  Explain disease process/condition, treatment plan, diagnostic tests and medications  Outcome: Progressing  Note: Patient is very pleasantly confused, disoriented to time, and situation, and at times, place. However, patient is very redirectable and follows commands. Patient reoriented frequently about using call light prior to getting out of bed for bathroom use, as well as necessity for stool sample. Patient is very forgetful, and consistently removing hat from toilet prior to BM's, even with RN instruction of importance to leave in.        Problem: Psychosocial  Goal: Patient's level of anxiety will decrease  Description: Target End Date:  1-3 days or as soon as patient condition allows    1.  Collaborate with patient and family/caregiver to identify triggers and develop strategies to cope with anxiety  2.  Implement stimuli reduction, calming techniques  3.  Pharmacologic management per  provider order  4.  Encourage patient/family/care giver participation  5.  Collaborate with interdisciplinary team including Psychologist or Behavioral Health Team as needed  Outcome: Progressing     Problem: Communication  Goal: The ability to communicate needs accurately and effectively will improve  Description: Target End Date:  End of day 1    1.  Assess ability to communicate and understand  2.  Provide augmentative or alternative methods of communication devices  3.  Use /language line as appropriate  4.  Collaborate with Speech Therapy as needed  Outcome: Progressing  Flowsheets (Taken 5/22/2024 0321)  Communication:   Assessed patient's ability to understand and communicate   Oriented patient to call light   Reoriented patient to environment     Problem: Skin Integrity  Goal: Skin integrity is maintained or improved  Description: Target End Date:  Prior to discharge or change in level of care    Document interventions on Skin Risk/Devante flowsheet groups and corresponding LDA    1.  Assess and monitor skin integrity, appearance and/or temperature  2.  Assess risk factors for impaired skin integrity and/or pressures ulcers  3.  Implement precautions to protect skin integrity in collaboration with interdisciplinary team  4.  Implement pressure ulcer prevention protocol if at risk for skin breakdown  5.  Confirm wound care consult if at risk for skin breakdown  6.  Ensure patient use of pressure relieving devices  (Low air loss bed, waffle overlay, heel protectors, ROHO cushion, etc)  Outcome: Progressing     Problem: Fall Risk  Goal: Patient will remain free from falls  Description: Target End Date:  Prior to discharge or change in level of care    Document interventions on the Norma Lorenz Fall Risk Assessment    1.  Assess for fall risk factors  2.  Implement fall precautions  Outcome: Progressing  Note: Bed in lowest position. Bed alarms in place and on. Frequent rounding in place throughout  this shift to promote patient safety and prevent falls.   Patient reoriented throughout the night, however remains non-compliant with call light use.        Patient is not progressing towards the following goals:

## 2024-05-23 PROBLEM — R73.02 IMPAIRED GLUCOSE TOLERANCE: Status: ACTIVE | Noted: 2024-05-23

## 2024-05-23 PROBLEM — M81.0 OSTEOPOROSIS: Status: ACTIVE | Noted: 2024-05-23

## 2024-05-23 PROBLEM — H91.90 HEARING LOSS: Status: ACTIVE | Noted: 2024-05-23

## 2024-05-23 PROBLEM — N18.31 STAGE 3A CHRONIC KIDNEY DISEASE: Status: ACTIVE | Noted: 2024-05-23

## 2024-05-23 LAB
25(OH)D3 SERPL-MCNC: 24 NG/ML (ref 30–100)
ALBUMIN SERPL BCP-MCNC: 3.5 G/DL (ref 3.2–4.9)
ALBUMIN/GLOB SERPL: 1.5 G/DL
ALP SERPL-CCNC: 60 U/L (ref 30–99)
ALT SERPL-CCNC: 8 U/L (ref 2–50)
ANION GAP SERPL CALC-SCNC: 11 MMOL/L (ref 7–16)
AST SERPL-CCNC: 15 U/L (ref 12–45)
BASOPHILS # BLD AUTO: 0.9 % (ref 0–1.8)
BASOPHILS # BLD: 0.07 K/UL (ref 0–0.12)
BILIRUB SERPL-MCNC: 0.4 MG/DL (ref 0.1–1.5)
BUN SERPL-MCNC: 8 MG/DL (ref 8–22)
CALCIUM ALBUM COR SERPL-MCNC: 8.8 MG/DL (ref 8.5–10.5)
CALCIUM SERPL-MCNC: 8.4 MG/DL (ref 8.5–10.5)
CHLORIDE SERPL-SCNC: 107 MMOL/L (ref 96–112)
CO2 SERPL-SCNC: 22 MMOL/L (ref 20–33)
CREAT SERPL-MCNC: 0.75 MG/DL (ref 0.5–1.4)
EKG IMPRESSION: NORMAL
EOSINOPHIL # BLD AUTO: 0.54 K/UL (ref 0–0.51)
EOSINOPHIL NFR BLD: 7.2 % (ref 0–6.9)
ERYTHROCYTE [DISTWIDTH] IN BLOOD BY AUTOMATED COUNT: 45.2 FL (ref 35.9–50)
FERRITIN SERPL-MCNC: 183 NG/ML (ref 22–322)
GFR SERPLBLD CREATININE-BSD FMLA CKD-EPI: 92 ML/MIN/1.73 M 2
GLOBULIN SER CALC-MCNC: 2.4 G/DL (ref 1.9–3.5)
GLUCOSE SERPL-MCNC: 86 MG/DL (ref 65–99)
HCT VFR BLD AUTO: 36 % (ref 42–52)
HGB BLD-MCNC: 12 G/DL (ref 14–18)
IMM GRANULOCYTES # BLD AUTO: 0.03 K/UL (ref 0–0.11)
IMM GRANULOCYTES NFR BLD AUTO: 0.4 % (ref 0–0.9)
IRON SATN MFR SERPL: 22 % (ref 15–55)
IRON SERPL-MCNC: 50 UG/DL (ref 50–180)
LYMPHOCYTES # BLD AUTO: 2 K/UL (ref 1–4.8)
LYMPHOCYTES NFR BLD: 26.6 % (ref 22–41)
MAGNESIUM SERPL-MCNC: 2 MG/DL (ref 1.5–2.5)
MCH RBC QN AUTO: 31.3 PG (ref 27–33)
MCHC RBC AUTO-ENTMCNC: 33.3 G/DL (ref 32.3–36.5)
MCV RBC AUTO: 94 FL (ref 81.4–97.8)
MONOCYTES # BLD AUTO: 0.85 K/UL (ref 0–0.85)
MONOCYTES NFR BLD AUTO: 11.3 % (ref 0–13.4)
NEUTROPHILS # BLD AUTO: 4.02 K/UL (ref 1.82–7.42)
NEUTROPHILS NFR BLD: 53.6 % (ref 44–72)
NRBC # BLD AUTO: 0 K/UL
NRBC BLD-RTO: 0 /100 WBC (ref 0–0.2)
PLATELET # BLD AUTO: 212 K/UL (ref 164–446)
PMV BLD AUTO: 11.2 FL (ref 9–12.9)
POTASSIUM SERPL-SCNC: 3.9 MMOL/L (ref 3.6–5.5)
PROT SERPL-MCNC: 5.9 G/DL (ref 6–8.2)
RBC # BLD AUTO: 3.83 M/UL (ref 4.7–6.1)
SODIUM SERPL-SCNC: 140 MMOL/L (ref 135–145)
TIBC SERPL-MCNC: 228 UG/DL (ref 250–450)
UIBC SERPL-MCNC: 178 UG/DL (ref 110–370)
VIT B12 SERPL-MCNC: 853 PG/ML (ref 211–911)
WBC # BLD AUTO: 7.5 K/UL (ref 4.8–10.8)

## 2024-05-23 PROCEDURE — 83540 ASSAY OF IRON: CPT

## 2024-05-23 PROCEDURE — 36415 COLL VENOUS BLD VENIPUNCTURE: CPT

## 2024-05-23 PROCEDURE — 770001 HCHG ROOM/CARE - MED/SURG/GYN PRIV*

## 2024-05-23 PROCEDURE — 80053 COMPREHEN METABOLIC PANEL: CPT

## 2024-05-23 PROCEDURE — 93010 ELECTROCARDIOGRAM REPORT: CPT | Performed by: INTERNAL MEDICINE

## 2024-05-23 PROCEDURE — 700102 HCHG RX REV CODE 250 W/ 637 OVERRIDE(OP)

## 2024-05-23 PROCEDURE — 82728 ASSAY OF FERRITIN: CPT

## 2024-05-23 PROCEDURE — A9270 NON-COVERED ITEM OR SERVICE: HCPCS

## 2024-05-23 PROCEDURE — 85025 COMPLETE CBC W/AUTO DIFF WBC: CPT

## 2024-05-23 PROCEDURE — C9113 INJ PANTOPRAZOLE SODIUM, VIA: HCPCS | Performed by: STUDENT IN AN ORGANIZED HEALTH CARE EDUCATION/TRAINING PROGRAM

## 2024-05-23 PROCEDURE — 83735 ASSAY OF MAGNESIUM: CPT

## 2024-05-23 PROCEDURE — 82607 VITAMIN B-12: CPT

## 2024-05-23 PROCEDURE — 93005 ELECTROCARDIOGRAM TRACING: CPT | Performed by: INTERNAL MEDICINE

## 2024-05-23 PROCEDURE — 82306 VITAMIN D 25 HYDROXY: CPT

## 2024-05-23 PROCEDURE — 83550 IRON BINDING TEST: CPT

## 2024-05-23 PROCEDURE — 99232 SBSQ HOSP IP/OBS MODERATE 35: CPT | Mod: GC | Performed by: INTERNAL MEDICINE

## 2024-05-23 PROCEDURE — 99231 SBSQ HOSP IP/OBS SF/LOW 25: CPT | Performed by: NURSE PRACTITIONER

## 2024-05-23 PROCEDURE — 99232 SBSQ HOSP IP/OBS MODERATE 35: CPT | Performed by: NURSE PRACTITIONER

## 2024-05-23 PROCEDURE — 700111 HCHG RX REV CODE 636 W/ 250 OVERRIDE (IP): Performed by: STUDENT IN AN ORGANIZED HEALTH CARE EDUCATION/TRAINING PROGRAM

## 2024-05-23 RX ORDER — ONDANSETRON 2 MG/ML
4 INJECTION INTRAMUSCULAR; INTRAVENOUS
Status: DISCONTINUED | OUTPATIENT
Start: 2024-05-23 | End: 2024-05-25 | Stop reason: HOSPADM

## 2024-05-23 RX ADMIN — CEFUROXIME AXETIL 750 MG: 500 TABLET ORAL at 21:16

## 2024-05-23 RX ADMIN — DONEPEZIL HYDROCHLORIDE 5 MG: 5 TABLET, FILM COATED ORAL at 21:16

## 2024-05-23 RX ADMIN — METRONIDAZOLE 500 MG: 500 TABLET ORAL at 04:20

## 2024-05-23 RX ADMIN — CEFUROXIME AXETIL 750 MG: 500 TABLET ORAL at 13:45

## 2024-05-23 RX ADMIN — METRONIDAZOLE 500 MG: 500 TABLET ORAL at 16:07

## 2024-05-23 RX ADMIN — CYANOCOBALAMIN TAB 500 MCG 1000 MCG: 500 TAB at 04:19

## 2024-05-23 RX ADMIN — PANTOPRAZOLE SODIUM 40 MG: 40 INJECTION, POWDER, FOR SOLUTION INTRAVENOUS at 04:19

## 2024-05-23 RX ADMIN — PANTOPRAZOLE SODIUM 40 MG: 40 INJECTION, POWDER, FOR SOLUTION INTRAVENOUS at 16:07

## 2024-05-23 RX ADMIN — CEFUROXIME AXETIL 750 MG: 500 TABLET ORAL at 04:20

## 2024-05-23 RX ADMIN — DOXEPIN HYDROCHLORIDE 10 MG: 10 CAPSULE ORAL at 21:16

## 2024-05-23 ASSESSMENT — ENCOUNTER SYMPTOMS
SPUTUM PRODUCTION: 0
PALPITATIONS: 0
NERVOUS/ANXIOUS: 0
VOMITING: 0
NAUSEA: 0
WEAKNESS: 1
SORE THROAT: 0
BLURRED VISION: 0
WEIGHT LOSS: 0
BACK PAIN: 0
HEARTBURN: 0
DEPRESSION: 0
CHILLS: 0
ABDOMINAL PAIN: 1
MYALGIAS: 0
COUGH: 0
DIARRHEA: 0
HEADACHES: 0
BACK PAIN: 1
BLOOD IN STOOL: 0
WEAKNESS: 0
CONSTIPATION: 0
DOUBLE VISION: 0
FEVER: 0
SHORTNESS OF BREATH: 0
DIZZINESS: 0

## 2024-05-23 ASSESSMENT — LIFESTYLE VARIABLES: SUBSTANCE_ABUSE: 0

## 2024-05-23 ASSESSMENT — FIBROSIS 4 INDEX: FIB4 SCORE: 1.98

## 2024-05-23 ASSESSMENT — PAIN DESCRIPTION - PAIN TYPE
TYPE: ACUTE PAIN
TYPE: ACUTE PAIN

## 2024-05-23 NOTE — CARE PLAN
The patient is Stable - Low risk of patient condition declining or worsening    Shift Goals  Clinical Goals: Oral antibiotics, stool sample, rest, safety  Patient Goals: food, warmth  Family Goals: rest, safety, and contination of goals of care with GI testing    Progress made toward(s) clinical / shift goals:      NO BM this shift. Unable to collect stool sample due to no stools this shift.     Problem: Knowledge Deficit - Standard  Goal: Patient and family/care givers will demonstrate understanding of plan of care, disease process/condition, diagnostic tests and medications  Description: Target End Date:  1-3 days or as soon as patient condition allows    Document in Patient Education    1.  Patient and family/caregiver oriented to unit, equipment, visitation policy and means for communicating concern  2.  Complete/review Learning Assessment  3.  Assess knowledge level of disease process/condition, treatment plan, diagnostic tests and medications  4.  Explain disease process/condition, treatment plan, diagnostic tests and medications  Outcome: Progressing  Note: Patient and son bedside updated on plan of care, including transition to oral antibiotics from IV, continued attempt to obtain stool sample for testing, continued monitoring of bleeding and labs, specifically hemoglobin, as well as falls prevention.   Patient is very pleasantly confused and forgetful, requiring frequent reorientation, however, cooperative with goals of care.      Problem: Communication  Goal: The ability to communicate needs accurately and effectively will improve  Description: Target End Date:  End of day 1    1.  Assess ability to communicate and understand  2.  Provide augmentative or alternative methods of communication devices  3.  Use /language line as appropriate  4.  Collaborate with Speech Therapy as needed  Outcome: Progressing     Problem: Gastrointestinal Irritability  Goal: Diarrhea will be absent or  improved  Description: Target End Date:  Prior to discharge or change in level of care    1.  Assess for abdominal discomfort, pain, cramping, frequency, urgency, loose or liquid stools, and hyperactive bowel sensations.  2.  Evaluate pattern of defecation  3.  Administer antidiarrheal agents per order  4. Culture stool, per order  5.  Inquire about food intolerances, medications, change in eating pattern and current stressors  6.  Assess for fecal impaction  7.  Assess hydration status  8.  Assess condition of perianal skin  9.  Loss of bowel elimination control can lead of feelings of decreased self esteem.  Examine the emotional impact of illness, hospitalization and/or accidents.  Outcome: Progressing  Note: NO BM'S OVERNIGHT this shift.      Problem: Fall Risk  Goal: Patient will remain free from falls  Description: Target End Date:  Prior to discharge or change in level of care    Document interventions on the Norma Lorenz Fall Risk Assessment    1.  Assess for fall risk factors  2.  Implement fall precautions  Outcome: Progressing  Note: Bed in lowest position. Bed alarm in place and on. Frequent rounding utilized, as well as frequent reorientation.   Patient non-compliant with call light use, however, very easily redirectable and follows commands.        Patient is not progressing towards the following goals:

## 2024-05-23 NOTE — PROGRESS NOTES
MEDICAL STUDENT NOTE  This note is intended for the purposes of medical student education and feedback only.   Please refer to the documentation by this patient's assigned medical practitioner for details of care and plans.    INTERNAL MEDICINE PROGRESS NOTE          PATIENT ID:  NAME:  Indio Cohen  MRN:               [id number]  YOB: 1945    Date of Admission: 5/20/2024     Attending: Nick Edmonds M.d.     Medical Student: Nam Dumont    Primary Care Physician:  @PCP    HPI:   Indio Cohen is a 78 y.o. male with hx of infrarenal AAA (s/p stent graft in 2013) admitted as direct transfer from [facility] for ischemic bowel disease and left renal infarc on hospital day 3. He initially presented to the transferring facility with complaints of left sided abdominal pain and bright red blood per rectum x3 days. The patient denied any lightheadedness or dizziness. The patient was ultimately transferred to obtain General Surgery and Vascular consults.      Labs at the outlying facility revealed a WBC of 17 and he was started on Zosyn. CTA Abdomen pelvis also showed severe wall thickening of sigmoid colon and rectum consistent with colitis and proctitis. This could be due to infection vs. Inflammatory vs. Ischemic etiology. CTA also revealed 20% infarct vs ischemia of left kidney    Interval Problem Update  Hemoglobin stable at 12.0 this AM, continue q1day checks    C-Diff and Stool Cultures still pending    Echo still pending    I have personally seen and examined the patient at bedside. I discussed the plan of care with patient, family, bedside RN, , and pharmacy.    SUBJECTIVE:   No acute events overnight, patient feels the same from yesterday. He has no complaints of abdominal pain. No bleeding per rectum. Patient does express interest in eating solid foods and is tired of his liquid diet.     OBJECTIVE:    PHYSICAL EXAM:  /71   Pulse 86   Temp 36.6 °C (97.9 °F) (Temporal)    "Resp 18   Ht 1.676 m (5' 6\")   Wt 45.6 kg (100 lb 8.5 oz)   SpO2 96%   BMI 16.23 kg/m²   Physical Exam  Constitutional:       Appearance: Normal appearance.      Comments: Sitting up in bed and sipping coffee   Cardiovascular:      Rate and Rhythm: Normal rate and regular rhythm.      Pulses: Normal pulses.   Pulmonary:      Effort: Pulmonary effort is normal.      Breath sounds: Normal breath sounds.   Abdominal:      General: Abdomen is flat.      Palpations: Abdomen is soft.      Tenderness: There is abdominal tenderness (left lower quadrant). There is no guarding or rebound.   Musculoskeletal:         General: No swelling.   Skin:     General: Skin is warm and dry.   Neurological:      Mental Status: He is alert and oriented to person, place, and time.   Psychiatric:         Mood and Affect: Mood normal.         Behavior: Behavior normal.         Intake/Output Summary (Last 24 hours) at 5/23/2024 0743  Last data filed at 5/23/2024 0431  Gross per 24 hour   Intake 2300 ml   Output 1000 ml   Net 1300 ml       LABS:  Recent Labs     05/20/24  2147 05/21/24  0211 05/21/24  0641 05/22/24  0157 05/22/24  0626 05/22/24  1025 05/23/24  0129   WBC 14.3* 13.5*  --  9.9  --   --  7.5   RBC 4.06* 3.78*  --  3.50*  --   --  3.83*   HEMOGLOBIN 12.9* 12.0*   < > 11.0* 11.9* 12.3* 12.0*   HEMATOCRIT 38.2* 35.4*   < > 32.8* 36.2* 36.6* 36.0*   MCV 94.1 93.7  --  93.7  --   --  94.0   MCH 31.8 31.7  --  31.4  --   --  31.3   RDW 45.5 44.5  --  45.2  --   --  45.2   PLATELETCT 198 192  --  189  --   --  212   MPV 11.3 11.5  --  11.2  --   --  11.2   NEUTSPOLYS 74.20*  --   --  65.70  --   --  53.60   LYMPHOCYTES 14.70*  --   --  20.10*  --   --  26.60   MONOCYTES 8.90  --   --  10.30  --   --  11.30   EOSINOPHILS 1.30  --   --  2.90  --   --  7.20*   BASOPHILS 0.60  --   --  0.70  --   --  0.90    < > = values in this interval not displayed.     Recent Labs     05/20/24  2147 05/21/24  0211 05/22/24  0157 05/23/24  0129 "   SODIUM 142 143 141 140   POTASSIUM 3.8 3.9 3.8 3.9   CHLORIDE 109 112 111 107   CO2 23 21 20 22   BUN 16 15 11 8   CREATININE 0.87 0.69 0.62 0.75   CALCIUM 8.3* 7.7* 7.8* 8.4*   MAGNESIUM 2.0  --  2.0 2.0   PHOSPHORUS 2.5  --   --   --    ALBUMIN 3.7 3.4 3.0* 3.5     Estimated GFR/CRCL = CrCl cannot be calculated (Unknown ideal weight.).  Recent Labs     05/21/24 0211 05/22/24  0157 05/23/24  0129   GLUCOSE 114* 83 86     Recent Labs     05/21/24 0211 05/22/24  0157 05/23/24  0129   ASTSGOT 14 13 15   ALTSGPT 7 7 8   TBILIRUBIN 0.7 0.3 0.4   ALKPHOSPHAT 64 55 60   GLOBULIN 2.6 2.5 2.4       IMAGING:  CT-CTA COMPLETE THORACOABDOMINAL AORTA   Final Result         1.  4.1 cm infrarenal abdominal aortic aneurysm with stent graft in place.   2.  4.4 x 4.0 cm fusiform distal thoracic aortic aneurysm, radiographic follow-up and surveillance recommended as clinically appropriate.   3.  Emphysema   4.  Cholelithiasis   5.  Atherosclerosis and atherosclerotic coronary artery disease      OUTSIDE IMAGES-CT CHEST   Final Result      OUTSIDE IMAGES-CT ABDOMEN /PELVIS   Final Result      EC-ECHOCARDIOGRAM COMPLETE W/O CONT    (Results Pending)       CULTURES:   Results       Procedure Component Value Units Date/Time    CULTURE STOOL [[id number]]     Order Status: No result Specimen: Stool     C Diff by PCR rflx Toxin [[id number]]     Order Status: No result Specimen: Stool     BLOOD CULTURE [[id number]] Collected: 05/20/24 2147    Order Status: Completed Specimen: Blood from Peripheral Updated: 05/21/24 0641     Significant Indicator NEG     Source BLD     Site PERIPHERAL     Culture Result No Growth  Note: Blood cultures are incubated for 5 days and  are monitored continuously.Positive blood cultures  are called to the RN and reported as soon as  they are identified.      Narrative:      Left AC    BLOOD CULTURE [[id number]] Collected: 05/20/24 2147    Order Status: Completed Specimen: Blood from Peripheral Updated: 05/21/24  0641     Significant Indicator NEG     Source BLD     Site PERIPHERAL     Culture Result No Growth  Note: Blood cultures are incubated for 5 days and  are monitored continuously.Positive blood cultures  are called to the RN and reported as soon as  they are identified.      Narrative:      Right AC            MEDS:  Current Facility-Administered Medications   Medication Last Admin    cefUROXime (Ceftin) tablet 750 mg 750 mg at 05/23/24 0420    metroNIDAZOLE (Flagyl) tablet 500 mg 500 mg at 05/23/24 0420    Pharmacy Consult Request - C. difficile consult      [START ON 5/28/2024] alendronate (Fosamax) tablet 70 mg      cyanocobalamin (Vitamin B-12) tablet 1,000 mcg 1,000 mcg at 05/23/24 0419    donepezil (Aricept) tablet 5 mg 5 mg at 05/22/24 2019    doxepin (SINEquan) capsule 10 mg 10 mg at 05/22/24 2019    pantoprazole (Protonix) injection 40 mg 40 mg at 05/23/24 0419    acetaminophen (Tylenol) tablet 650 mg         ASSESSMENT/PLAN:  78 y.o. male with hx of left infrarenal AAA (s/p stent graft 2013) admitted for ischemic bowel disease and left renal infarct    #Ischemic Bowel Disease  Patient with left upper quadrant and bright red blood per rectum x3 days  CTA of abdomen/pelvis with severe wall thickening in the sigmoid colon and rectum consistent with colitis and proctitis. This could be due to infectious vs. Inflammatory vs ischemic etiology. JOHNY not clearly seen  Follow up CTA-Chest with patient celiac, SMA, and JOHNY. No dissection.  Continue Pantoprazole 40 mg IV BID  H&H stable this AM, will monitor q1day  WBC previously 17 at outlying facility, improved to 7.5 this AM  Continue PO Flagyl and Cefuroxime per pharmacy  General Surgery consulted and no surgical intervention  Continue to monitor with abdominal exams     #GI Bleed  Continue Pantoprazole 40 mg IV BID  H&H stable this AM at 12.0 and 36.0 this AM, will monitor q1day  Dr. Mendosa (GI) consulted and recommends stool culture and C. Difficile  testing  Patient still unable to give stool sample, but very low suspicion for C.Diff as patient as not had any large watery bowel movements   Will consult GI to discuss possible flexible sigmoidoscopy vs colonoscopy instead  Patient will remain on full liquid diet pending GI consult     #Renal Infarct of Left Kidney  CTA of Abdomen/Pelvis showing 20% ischemia or partial infarction of left kidney  Vascular consulted and has no indications for emergent surgical intervention or anticoagulation  Vascular believes this is secondary to stent graft, but recommends follow up outpatient for surveillance with whoever placed the stent graft  Vascular does recommends Echo, still pending     #AAA  CTA-chest with 4.1 cm infrarenal AAA with stent  Stable from December 2023  Continue surveillance monitoring outpatient    Core Measures:  Fluids: None   Lines: Peripheral IV  Abx: PO Flagyl and Cefuroxime  Diet: Full Liquid  PPX: pharmacologic prophylaxis contraindicated due to GI bleed    CODE Status: Full Code      Disposition  Patient is not medically cleared for discharge.   Anticipate discharge to to skilled nursing facility.  I have placed the appropriate orders for post-discharge needs.      Nam Dumont, MS3  [employer]

## 2024-05-23 NOTE — PROGRESS NOTES
..Gastroenterology Progress Note               Author:  Jessie Byrd, ALEJANDRO,  APRN Date & Time Created: 5/23/2024 7:54 AM       Patient ID:  Name:             Indio Cohen  YOB: 1945  Age:                 78 y.o.  male  MRN:               [id number]        Medical Decision Making, by Problem:  Active Hospital Problems    Diagnosis     Ischemic bowel disease (HCC) [K55.9]     Severe protein-calorie malnutrition (HCC) [E43]     GI bleed [K92.2]     Renal infarct (HCC) [N28.0]     Advanced care planning/counseling discussion [Z71.89]     Smoking [F17.200]     AAA (abdominal aortic aneurysm) ([facility]) [I71.40]        Presenting Chief Complaint:  Colitis, hematochezia     History of Present Illness:   78-year-old male transferred for abdominal pain and hematochezia.  Patient has been having this process going on for 3 days.  No bloody bowel movements overnight.  No fevers.  No sick contacts.  This is never happened before.  Patient has CT scan demonstrating colitis in the rectosigmoid region.  Patient denies history of colonoscopy.  No new medications.  Patient has not been on antibiotics recently.  Patient denies history of colonoscopy.  Patient has a history of AAA repair.  He has an endograft in place which appears stable.  Patient denies unintentional weight loss.  No hematemesis.  He is nauseous and has problems with vomiting.    Interval History:  5/22/2024: patient seen. Feels weak, tired, and reports all over pain. Per RN had 2 BM overnight however patient removed hat so sample was not taken. Discussed with patient that we need a sample. Hgb stable    5/23/2024: patient seen. Still complaining of weakness and back pain. No BM, Hgb 12, VSS    Hospital Medications:  Current Facility-Administered Medications   Medication Dose Frequency Provider Last Rate Last Admin    cefUROXime (Ceftin) tablet 750 mg  750 mg Q8HRS Linda Novak M.D.   750 mg at 05/23/24 0420    metroNIDAZOLE (Flagyl) tablet  "500 mg  500 mg Q12HRS Linda Novak M.D.   500 mg at 05/23/24 0420    Pharmacy Consult Request - C. difficile consult  1 Each PHARMACY TO DOSE Ben Mendosa M.D.        [START ON 5/28/2024] alendronate (Fosamax) tablet 70 mg  70 mg Q7 DAYS Linda Novak M.D.        cyanocobalamin (Vitamin B-12) tablet 1,000 mcg  1,000 mcg DAILY Linda Novak M.D.   1,000 mcg at 05/23/24 0419    donepezil (Aricept) tablet 5 mg  5 mg Nightly Linda Novak M.D.   5 mg at 05/22/24 2019    doxepin (SINEquan) capsule 10 mg  10 mg QHS PRN Linda Novak M.D.   10 mg at 05/22/24 2019    pantoprazole (Protonix) injection 40 mg  40 mg BID Mario Frost M.D.   40 mg at 05/23/24 0419    acetaminophen (Tylenol) tablet 650 mg  650 mg Q6HRS PRN Mario Frost M.D.       Last reviewed on 5/21/2024  9:22 AM by Ora Silver, Pharmacy Intern       Review of Systems:  Review of Systems   Constitutional:  Positive for malaise/fatigue. Negative for chills and fever.   HENT:  Negative for hearing loss.    Eyes:  Negative for blurred vision.   Respiratory:  Negative for cough and shortness of breath.    Cardiovascular:  Negative for chest pain and leg swelling.   Gastrointestinal:  Positive for abdominal pain. Negative for blood in stool, constipation, diarrhea, heartburn, melena, nausea and vomiting.   Genitourinary:  Negative for dysuria.   Musculoskeletal:  Positive for back pain.   Skin:  Negative for rash.   Neurological:  Positive for weakness. Negative for dizziness.   Psychiatric/Behavioral:  Negative for depression.    All other systems reviewed and are negative.        Vital signs:  Weight/BMI: Body mass index is 16.23 kg/m².  BP (!) 148/90   Pulse 72   Temp 36.3 °C (97.3 °F) (Temporal)   Resp 18   Ht 1.676 m (5' 6\")   Wt 45.6 kg (100 lb 8.5 oz)   SpO2 96%   Vitals:    05/22/24 1939 05/22/24 2355 05/23/24 0421 05/23/24 0744   BP: 139/83 118/64 126/71 (!) 148/90   Pulse: 81 72 86 72   Resp: 16 16 18 18   Temp: 36.4 °C (97.5 " °F) 36.7 °C (98.1 °F) 36.6 °C (97.9 °F) 36.3 °C (97.3 °F)   TempSrc: Temporal Temporal Temporal Temporal   SpO2: 96% 94% 96% 96%   Weight:   45.6 kg (100 lb 8.5 oz)    Height:         Oxygen Therapy:  Pulse Oximetry: 96 %, O2 (LPM): 0, O2 Delivery Device: None - Room Air    Intake/Output Summary (Last 24 hours) at 5/23/2024 0754  Last data filed at 5/23/2024 0431  Gross per 24 hour   Intake 2300 ml   Output 1000 ml   Net 1300 ml         Physical Exam:  Physical Exam  Vitals and nursing note reviewed.   Constitutional:       General: He is not in acute distress.     Appearance: He is ill-appearing.      Comments: Frail thin elderly male   HENT:      Head: Normocephalic and atraumatic.      Right Ear: External ear normal.      Left Ear: External ear normal.      Nose: Nose normal.      Mouth/Throat:      Mouth: Mucous membranes are moist.      Pharynx: Oropharynx is clear.      Comments: Poor dentition  Eyes:      General: No scleral icterus.  Cardiovascular:      Rate and Rhythm: Normal rate and regular rhythm.      Pulses: Normal pulses.      Heart sounds: Normal heart sounds.   Pulmonary:      Effort: Pulmonary effort is normal.      Breath sounds: Rales present.   Abdominal:      General: Abdomen is flat. Bowel sounds are normal. There is no distension.      Palpations: Abdomen is soft.      Tenderness: There is abdominal tenderness.   Musculoskeletal:         General: Normal range of motion.      Cervical back: Normal range of motion and neck supple.   Skin:     General: Skin is warm and dry.      Capillary Refill: Capillary refill takes less than 2 seconds.      Coloration: Skin is pale.   Neurological:      Mental Status: He is alert and oriented to person, place, and time.      Motor: Weakness present.   Psychiatric:         Mood and Affect: Mood normal.         Behavior: Behavior normal.             Labs:  Recent Labs     05/20/24  2147 05/21/24  0211 05/22/24  0157 05/23/24  0129   SODIUM 142 143 141 140    POTASSIUM 3.8 3.9 3.8 3.9   CHLORIDE 109 112 111 107   CO2 23 21 20 22   BUN 16 15 11 8   CREATININE 0.87 0.69 0.62 0.75   MAGNESIUM 2.0  --  2.0 2.0   PHOSPHORUS 2.5  --   --   --    CALCIUM 8.3* 7.7* 7.8* 8.4*     Recent Labs     05/21/24  0211 05/22/24  0157 05/23/24  0129   ALTSGPT 7 7 8   ASTSGOT 14 13 15   ALKPHOSPHAT 64 55 60   TBILIRUBIN 0.7 0.3 0.4   GLUCOSE 114* 83 86     Recent Labs     05/20/24  2147 05/21/24 0211 05/22/24  0157 05/23/24  0129   WBC 14.3* 13.5* 9.9 7.5   NEUTSPOLYS 74.20*  --  65.70 53.60   LYMPHOCYTES 14.70*  --  20.10* 26.60   MONOCYTES 8.90  --  10.30 11.30   EOSINOPHILS 1.30  --  2.90 7.20*   BASOPHILS 0.60  --  0.70 0.90   ASTSGOT 15 14 13 15   ALTSGPT 9 7 7 8   ALKPHOSPHAT 73 64 55 60   TBILIRUBIN 0.7 0.7 0.3 0.4     Recent Labs     05/21/24  0211 05/21/24  0641 05/22/24  0157 05/22/24  0626 05/22/24  1025 05/23/24  0129   RBC 3.78*  --  3.50*  --   --  3.83*   HEMOGLOBIN 12.0*   < > 11.0* 11.9* 12.3* 12.0*   HEMATOCRIT 35.4*   < > 32.8* 36.2* 36.6* 36.0*   PLATELETCT 192  --  189  --   --  212    < > = values in this interval not displayed.     Recent Results (from the past 24 hour(s))   HEMOGLOBIN AND HEMATOCRIT    Collection Time: 05/22/24 10:25 AM   Result Value Ref Range    Hemoglobin 12.3 (L) 14.0 - 18.0 g/dL    Hematocrit 36.6 (L) 42.0 - 52.0 %   CBC WITH DIFFERENTIAL    Collection Time: 05/23/24  1:29 AM   Result Value Ref Range    WBC 7.5 4.8 - 10.8 K/uL    RBC 3.83 (L) 4.70 - 6.10 M/uL    Hemoglobin 12.0 (L) 14.0 - 18.0 g/dL    Hematocrit 36.0 (L) 42.0 - 52.0 %    MCV 94.0 81.4 - 97.8 fL    MCH 31.3 27.0 - 33.0 pg    MCHC 33.3 32.3 - 36.5 g/dL    RDW 45.2 35.9 - 50.0 fL    Platelet Count 212 164 - 446 K/uL    MPV 11.2 9.0 - 12.9 fL    Neutrophils-Polys 53.60 44.00 - 72.00 %    Lymphocytes 26.60 22.00 - 41.00 %    Monocytes 11.30 0.00 - 13.40 %    Eosinophils 7.20 (H) 0.00 - 6.90 %    Basophils 0.90 0.00 - 1.80 %    Immature Granulocytes 0.40 0.00 - 0.90 %    Nucleated  RBC 0.00 0.00 - 0.20 /100 WBC    Neutrophils (Absolute) 4.02 1.82 - 7.42 K/uL    Lymphs (Absolute) 2.00 1.00 - 4.80 K/uL    Monos (Absolute) 0.85 0.00 - 0.85 K/uL    Eos (Absolute) 0.54 (H) 0.00 - 0.51 K/uL    Baso (Absolute) 0.07 0.00 - 0.12 K/uL    Immature Granulocytes (abs) 0.03 0.00 - 0.11 K/uL    NRBC (Absolute) 0.00 K/uL   Comp Metabolic Panel    Collection Time: 05/23/24  1:29 AM   Result Value Ref Range    Sodium 140 135 - 145 mmol/L    Potassium 3.9 3.6 - 5.5 mmol/L    Chloride 107 96 - 112 mmol/L    Co2 22 20 - 33 mmol/L    Anion Gap 11.0 7.0 - 16.0    Glucose 86 65 - 99 mg/dL    Bun 8 8 - 22 mg/dL    Creatinine 0.75 0.50 - 1.40 mg/dL    Calcium 8.4 (L) 8.5 - 10.5 mg/dL    Correct Calcium 8.8 8.5 - 10.5 mg/dL    AST(SGOT) 15 12 - 45 U/L    ALT(SGPT) 8 2 - 50 U/L    Alkaline Phosphatase 60 30 - 99 U/L    Total Bilirubin 0.4 0.1 - 1.5 mg/dL    Albumin 3.5 3.2 - 4.9 g/dL    Total Protein 5.9 (L) 6.0 - 8.2 g/dL    Globulin 2.4 1.9 - 3.5 g/dL    A-G Ratio 1.5 g/dL   MAGNESIUM    Collection Time: 05/23/24  1:29 AM   Result Value Ref Range    Magnesium 2.0 1.5 - 2.5 mg/dL   ESTIMATED GFR    Collection Time: 05/23/24  1:29 AM   Result Value Ref Range    GFR (CKD-EPI) 92 >60 mL/min/1.73 m 2       Radiology Review:  CT-CTA COMPLETE THORACOABDOMINAL AORTA   Final Result         1.  4.1 cm infrarenal abdominal aortic aneurysm with stent graft in place.   2.  4.4 x 4.0 cm fusiform distal thoracic aortic aneurysm, radiographic follow-up and surveillance recommended as clinically appropriate.   3.  Emphysema   4.  Cholelithiasis   5.  Atherosclerosis and atherosclerotic coronary artery disease      OUTSIDE IMAGES-CT CHEST   Final Result      OUTSIDE IMAGES-CT ABDOMEN /PELVIS   Final Result      EC-ECHOCARDIOGRAM COMPLETE W/O CONT    (Results Pending)     MDM (Data Review):   -Records reviewed and summarized in current documentation  -I personally reviewed and interpreted the laboratory results  -I personally reviewed  the radiology images    Assessment/Recommendations:  Hematochezia  Proctocolitis  Vasculopathy with aortic stent graft  ?ischemic vs infectious colitis vs IBD  Normocytic anemia    Recommendations:  ---r/o iron deficiency anemia, labs pending  ---C-diff cancelled, patient not stooling  ---Plan for flexible sigmoidoscopy tomorrow  ---2 fleets enema prior to procedure  ---Npo at midnight    GI will follow.    Discussed with patient, RN, primary team, and Dr. Almonte    Core Quality Measures   Reviewed items::  Labs, Medications and Radiology reports reviewed

## 2024-05-23 NOTE — PROGRESS NOTES
[facility] Internal Medicine Daily Progress Note    Date of Service  5/23/2024    [facility] Team: R IM Green Team   Attending: Nick Edmonds M.d.  Senior Resident: Dr. Izaguirre  Intern:  Dr. Novak  Contact Number: [phone]    Chief Complaint  Indio Cohen is a 78 y.o. male admitted 5/20/2024 with abdominal pain and bright red blood per rectum.    Hospital Course  Indio Cohen is a 78 y.o. male who presented on 5/20/2024 as a transfer from outside facility.  Patient initially presented to outside facility complaining of abdominal pain and bright red blood per rectum.  Stated that the pain and bloody bowel movements were going on for 2 to 3 days now.  Denies any fevers, chills, nausea, vomiting.  He states that the pain is worse in the left upper quadrant.  CT angiography of the abdomen pelvis with IV contrast was obtained.  There is severe wall thickening the sigmoid colon and rectum consistent with colitis and proctitis. This could be due to infectious versus inflammatory versus ischemic etiology.  Inferior mesenteric artery is not clearly seen.  There is a 4.1 cm abdominal aortic aneurysm with stent graft present that appears to be stable compared to December 2023.  There is a patch uptake of contrast in the posterior aspect of the left kidney extending into the upper pole of the left kidney.  This could be due to ischemia or partial infarction approximately 20% of the left kidney.   CT scan of the chest shows moderate emphysematous changes.  Coronary artery disease.  There is moderate size hiatal hernia.  4.8 mm nodule at the left lung base. Labs were notable for leukocytosis greater than 17,000.  Hemoglobin appears to be stable at 14.  He had a creatinine level of 1.32, which appears to be near his baseline.  Lactic acid was 1.3.  Urinalysis unremarkable.   Case is been discussed with both general surgery and vascular surgery.  No recommended surgical intervention at this time.  GI consulted: Order is not  stool culture, C. difficile and start full liquid diet. If stool studies are negative consider flexible sigmoidoscopy for biopsies versus full colonoscopy based on patient's clinical course.         Interval Problem Update  No acute events overnight.  Patient was seen and evaluated at bedside this a.m., denies abdominal pain.  Denies nausea, vomiting, chest pain or shortness of breath.  No bowel movements at all.  GI consulted will perform flexible sigmoidoscopy for biopsies versus tomorrow 5/24.  PT OT recommended home health but per  is no [facility].  Discussed with son of the patient about considering a SNF but he refers that patient does not want it.    I have discussed this patient's plan of care and discharge plan at IDT rounds today with Case Management, Nursing, Nursing leadership, and other members of the IDT team.    Consultants/Specialty  GI  General surgery  Vascular surgery    Code Status  Full Code    Disposition  The patient is not medically cleared for discharge to home or a post-acute facility.      I have placed the appropriate orders for post-discharge needs.    Review of Systems  Review of Systems   Constitutional:  Negative for chills, fever, malaise/fatigue and weight loss.   HENT:  Negative for congestion and sore throat.    Eyes:  Negative for blurred vision and double vision.   Respiratory:  Negative for cough, sputum production and shortness of breath.    Cardiovascular:  Negative for chest pain, palpitations and leg swelling.   Gastrointestinal:  Positive for abdominal pain (Left-sided). Negative for constipation, diarrhea, nausea and vomiting.   Genitourinary:  Negative for dysuria.   Musculoskeletal:  Negative for back pain, joint pain and myalgias.   Neurological:  Negative for dizziness, weakness and headaches.   Psychiatric/Behavioral:  Negative for substance abuse. The patient is not nervous/anxious.         Physical Exam  Temp:  [36.3 °C (97.3 °F)-36.9 °C (98.4  °F)] 36.3 °C (97.3 °F)  Pulse:  [68-86] 72  Resp:  [16-18] 18  BP: (118-148)/(64-90) 148/90  SpO2:  [94 %-99 %] 96 %    Physical Exam  Constitutional:       General: He is not in acute distress.     Appearance: Normal appearance. He is not ill-appearing.   HENT:      Head: Normocephalic and atraumatic.      Nose: Nose normal.      Mouth/Throat:      Mouth: Mucous membranes are moist.   Eyes:      Extraocular Movements: Extraocular movements intact.      Conjunctiva/sclera: Conjunctivae normal.      Pupils: Pupils are equal, round, and reactive to light.   Cardiovascular:      Rate and Rhythm: Normal rate and regular rhythm.      Pulses: Normal pulses.      Heart sounds: Normal heart sounds.   Pulmonary:      Effort: Pulmonary effort is normal. No respiratory distress.      Breath sounds: Normal breath sounds.   Chest:      Chest wall: No tenderness.   Abdominal:      General: Bowel sounds are normal. There is no distension.      Palpations: Abdomen is soft.      Tenderness: There is abdominal tenderness (Left-sided). There is no right CVA tenderness or left CVA tenderness.   Musculoskeletal:         General: No swelling or tenderness. Normal range of motion.      Cervical back: Normal range of motion and neck supple.      Right lower leg: No edema.      Left lower leg: No edema.   Skin:     General: Skin is warm.      Capillary Refill: Capillary refill takes less than 2 seconds.      Coloration: Skin is not jaundiced or pale.   Neurological:      General: No focal deficit present.      Mental Status: He is alert and oriented to person, place, and time.      Cranial Nerves: No cranial nerve deficit.      Sensory: No sensory deficit.      Motor: No weakness.   Psychiatric:         Mood and Affect: Mood normal.         Fluids    Intake/Output Summary (Last 24 hours) at 5/23/2024 1206  Last data filed at 5/23/2024 0431  Gross per 24 hour   Intake 1100 ml   Output 1000 ml   Net 100 ml       Laboratory  Recent Labs      05/21/24  0211 05/21/24  0641 05/22/24  0157 05/22/24  0626 05/22/24  1025 05/23/24  0129   WBC 13.5*  --  9.9  --   --  7.5   RBC 3.78*  --  3.50*  --   --  3.83*   HEMOGLOBIN 12.0*   < > 11.0* 11.9* 12.3* 12.0*   HEMATOCRIT 35.4*   < > 32.8* 36.2* 36.6* 36.0*   MCV 93.7  --  93.7  --   --  94.0   MCH 31.7  --  31.4  --   --  31.3   MCHC 33.9  --  33.5  --   --  33.3   RDW 44.5  --  45.2  --   --  45.2   PLATELETCT 192  --  189  --   --  212   MPV 11.5  --  11.2  --   --  11.2    < > = values in this interval not displayed.     Recent Labs     05/21/24  0211 05/22/24  0157 05/23/24  0129   SODIUM 143 141 140   POTASSIUM 3.9 3.8 3.9   CHLORIDE 112 111 107   CO2 21 20 22   GLUCOSE 114* 83 86   BUN 15 11 8   CREATININE 0.69 0.62 0.75   CALCIUM 7.7* 7.8* 8.4*             Recent Labs     05/21/24 0211   TRIGLYCERIDE 67   HDL 41   LDL 37       Imaging  CT-CTA COMPLETE THORACOABDOMINAL AORTA   Final Result         1.  4.1 cm infrarenal abdominal aortic aneurysm with stent graft in place.   2.  4.4 x 4.0 cm fusiform distal thoracic aortic aneurysm, radiographic follow-up and surveillance recommended as clinically appropriate.   3.  Emphysema   4.  Cholelithiasis   5.  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  There is severe wall thickening the sigmoid colon and rectum consistent with colitis and proctitis.  This could be due to infectious versus inflammatory versus ischemic etiology.  Inferior mesenteric artery is not clearly seen.    -Pantoprazole 40mg IV BID  -H/H [id number]   -Transfuse for hemoglobin  less than 7  -Empirically started the patient on IV Zosyn  -Switching to cefuroxime plus metronidazole, per ID Pharm recommendations  -FOBT pending  -General Surgery consulted: No surgical intervention at this point   -Vascular surgery consulted: No concerns about renal infarct. Do not recommend systemic anticoagulation in this patient for that indication and did not recommend any procedure at this time. Recommend getting an echocardiogram to rule out thromboembolic status along with the CTA of his chest as well  -GI consulted will perform flexible sigmoidoscopy for biopsies versus tomorrow 5/24.     Advanced care planning/counseling discussion  Assessment & Plan  I spent 17 minutes at bedside with nursing staff present with patient discussing work-up, results, diagnosis, prognosis. Talked to patient about his COPD, severe protein calorie malnutrition, hx of AAA, now presenting with ischemic bowel/infectious and a renal infarct    CODE STATUS discussed with patient and wants to be Full Code   - PT OT recommended home health but per  is no [facility].  Discussed with son of the patient about considering a SNF but he refers that patient does not want it.      Renal infarct (HCC)  Assessment & Plan  There is a patch uptake of contrast in the posterior aspect of the left kidney extending into the upper pole of the left kidney.  This could be due to ischemia or partial infarction approximately 20% of the left kidney.  Vascular surgery has been consulted.  No indication for any emergent surgical intervention  Further discussion was had with vascular surgeon.  He does not need anticoagulation from the renal infarct perspective.  This all likely secondary to the placement of the stent graft.  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  His SMA appears to be okay.  Recommending management for his GI bleed versus colitis.  Patient will need follow-up with whoever  placed the stent graft for surveillance.   He did recommend getting an echocardiogram to rule out thromboembolic status along with the CTA of his chest as well  -Echo pending  -CTA of the chest wall remarkable for 4.1 cm infrarenal abdominal aortic aneurysm with stent graft in place, 4.4 x 4.0 cm fusiform distal thoracic aortic aneurysm, radiographic follow-up and surveillance recommended as clinically appropriate. Emphysema. Cholelithiasis. Atherosclerosis and atherosclerotic coronary artery disease.       GI bleed  Assessment & Plan  Patient states he is happy  Blood per rectum for last 2 to 3 days.'s likely secondary to the colitis versus proctitis seen on CT imaging  -Pantoprazole 40 mg IV twice daily  -Hemoglobin hematocrit every 4 hours  -Transfuse for hemoglobin less than 7  -GI consulted: Order is not stool culture, C. difficile and start full liquid diet. If stool studies are negative consider flexible sigmoidoscopy for biopsies versus full colonoscopy based on patient's clinical course.     Severe protein-calorie malnutrition (HCC)  Assessment & Plan  BMI 15.91  -Nutrition consult placed    Smoking- (present on admission)  Assessment & Plan  Patient smokes < 1PPD.  Nicotine patch and/or gum offered.   I discussed cessation with patient including starting on nicotine patch and/or gum on discharge.  I also discussed medications to help with cessation with patient including Wellbutrin and Chantix, offered .  Smoking cessation discussed with patient for 5 minutes.      AAA (abdominal aortic aneurysm) (HCC)- (present on admission)  Assessment & Plan  History of 4.1cm AAA seen on CTA.   -Graft in place   -Appears to be stable   -No hemodynamically instability         VTE prophylaxis: SCDs/TEDs    I have performed a physical exam and reviewed and updated ROS and Plan today (5/23/2024). In review of yesterday's note (5/22/2024), there are no changes except as documented above.    Linda Novak MD  Internal  Medicine PGY-1

## 2024-05-23 NOTE — PROGRESS NOTES
Telemetry Monitor Report    Rhythm: SR  Ectopy: PAC  Hx: PVC  HR Range: 67 - 72    .13/.10/.44

## 2024-05-23 NOTE — PROGRESS NOTES
"  DATE: 5/23/2024    Hospital Day 3  colitis    INTERVAL EVENTS:  WBC continue downward trend  Remains on full liquid diet  Abdominal pain improved  No BM overnight    REVIEW OF SYSTEMS:  Comprehensive review of systems is negative with the exception of the aforementioned HPI, PMH, and PSH bullets in accordance with CMS guidelines.    PHYSICAL EXAMINATION:  Vital Signs: BP (!) 148/90   Pulse 72   Temp 36.3 °C (97.3 °F) (Temporal)   Resp 18   Ht 1.676 m (5' 6\")   Wt 45.6 kg (100 lb 8.5 oz)   SpO2 96%   Physical Exam  Vitals reviewed.   Constitutional:       Appearance: He is not toxic-appearing.   HENT:      Mouth/Throat:      Mouth: Mucous membranes are moist.      Pharynx: Oropharynx is clear.   Eyes:      Conjunctiva/sclera: Conjunctivae normal.   Pulmonary:      Effort: Pulmonary effort is normal.   Abdominal:      General: There is no distension.      Palpations: Abdomen is soft.      Tenderness: There is no abdominal tenderness.   Neurological:      Mental Status: He is alert.         LABORATORY VALUES:  Recent Labs     05/21/24  0211 05/21/24  0641 05/22/24  0157 05/22/24  0626 05/22/24  1025 05/23/24  0129   WBC 13.5*  --  9.9  --   --  7.5   RBC 3.78*  --  3.50*  --   --  3.83*   HEMOGLOBIN 12.0*   < > 11.0* 11.9* 12.3* 12.0*   HEMATOCRIT 35.4*   < > 32.8* 36.2* 36.6* 36.0*   MCV 93.7  --  93.7  --   --  94.0   MCH 31.7  --  31.4  --   --  31.3   MCHC 33.9  --  33.5  --   --  33.3   RDW 44.5  --  45.2  --   --  45.2   PLATELETCT 192  --  189  --   --  212   MPV 11.5  --  11.2  --   --  11.2    < > = values in this interval not displayed.     Recent Labs     05/21/24  0211 05/22/24  0157 05/23/24  0129   SODIUM 143 141 140   POTASSIUM 3.9 3.8 3.9   CHLORIDE 112 111 107   CO2 21 20 22   GLUCOSE 114* 83 86   BUN 15 11 8   CREATININE 0.69 0.62 0.75   CALCIUM 7.7* 7.8* 8.4*     Recent Labs     05/21/24  0211 05/22/24  0157 05/23/24  0129   ASTSGOT 14 13 15   ALTSGPT 7 7 8   TBILIRUBIN 0.7 0.3 0.4 "   ALKPHOSPHAT 64 55 60   GLOBULIN 2.6 2.5 2.4           IMAGING:  CT-CTA COMPLETE THORACOABDOMINAL AORTA   Final Result         1.  4.1 cm infrarenal abdominal aortic aneurysm with stent graft in place.   2.  4.4 x 4.0 cm fusiform distal thoracic aortic aneurysm, radiographic follow-up and surveillance recommended as clinically appropriate.   3.  Emphysema   4.  Cholelithiasis   5.  Atherosclerosis and atherosclerotic coronary artery disease      OUTSIDE IMAGES-CT CHEST   Final Result      OUTSIDE IMAGES-CT ABDOMEN /PELVIS   Final Result      EC-ECHOCARDIOGRAM COMPLETE W/O CONT    (Results Pending)       ASSESSMENT AND PLAN:  Okay for regular diet from surgical perspective  No current surgical needs identified        ____________________________________     Swati Pozo A.P.R.N.    DD: 5/23/2024  12:14 PM

## 2024-05-23 NOTE — CARE PLAN
The patient is Stable - Low risk of patient condition declining or worsening    Shift Goals  Clinical Goals: NPO at midnight, abx  Patient Goals: food  Family Goals: rest, safety, and contination of goals of care with GI testing    Progress made toward(s) clinical / shift goals:    Problem: Knowledge Deficit - Standard  Goal: Patient and family/care givers will demonstrate understanding of plan of care, disease process/condition, diagnostic tests and medications  Description: Target End Date:  1-3 days or as soon as patient condition allows    Document in Patient Education    1.  Patient and family/caregiver oriented to unit, equipment, visitation policy and means for communicating concern  2.  Complete/review Learning Assessment  3.  Assess knowledge level of disease process/condition, treatment plan, diagnostic tests and medications  4.  Explain disease process/condition, treatment plan, diagnostic tests and medications  Outcome: Progressing     Problem: Psychosocial  Goal: Patient's ability to verbalize feelings about condition will improve  Description: Target End Date:  Prior to discharge or change in level of care    1.  Discuss coping with medical condition and its effects  2.  Encourage patient participation in care  3.  Encourage acknowledgement of body changes and accompanying emotions  4.  Perform depression screening  Outcome: Progressing       Patient is not progressing towards the following goals:

## 2024-05-24 ENCOUNTER — ANESTHESIA EVENT (OUTPATIENT)
Dept: SURGERY | Facility: MEDICAL CENTER | Age: 79
DRG: 393 | End: 2024-05-24
Payer: COMMERCIAL

## 2024-05-24 ENCOUNTER — APPOINTMENT (OUTPATIENT)
Dept: CARDIOLOGY | Facility: MEDICAL CENTER | Age: 79
DRG: 393 | End: 2024-05-24
Payer: COMMERCIAL

## 2024-05-24 ENCOUNTER — ANESTHESIA (OUTPATIENT)
Dept: SURGERY | Facility: MEDICAL CENTER | Age: 79
DRG: 393 | End: 2024-05-24
Payer: COMMERCIAL

## 2024-05-24 LAB
ALBUMIN SERPL BCP-MCNC: 3.5 G/DL (ref 3.2–4.9)
ALBUMIN/GLOB SERPL: 1.3 G/DL
ALP SERPL-CCNC: 63 U/L (ref 30–99)
ALT SERPL-CCNC: 7 U/L (ref 2–50)
ANION GAP SERPL CALC-SCNC: 10 MMOL/L (ref 7–16)
AST SERPL-CCNC: 15 U/L (ref 12–45)
BASOPHILS # BLD AUTO: 1 % (ref 0–1.8)
BASOPHILS # BLD: 0.09 K/UL (ref 0–0.12)
BILIRUB SERPL-MCNC: 0.2 MG/DL (ref 0.1–1.5)
BUN SERPL-MCNC: 19 MG/DL (ref 8–22)
CALCIUM ALBUM COR SERPL-MCNC: 8.9 MG/DL (ref 8.5–10.5)
CALCIUM SERPL-MCNC: 8.5 MG/DL (ref 8.5–10.5)
CHLORIDE SERPL-SCNC: 105 MMOL/L (ref 96–112)
CO2 SERPL-SCNC: 23 MMOL/L (ref 20–33)
CREAT SERPL-MCNC: 0.93 MG/DL (ref 0.5–1.4)
EOSINOPHIL # BLD AUTO: 0.59 K/UL (ref 0–0.51)
EOSINOPHIL NFR BLD: 6.5 % (ref 0–6.9)
ERYTHROCYTE [DISTWIDTH] IN BLOOD BY AUTOMATED COUNT: 46.3 FL (ref 35.9–50)
GFR SERPLBLD CREATININE-BSD FMLA CKD-EPI: 83 ML/MIN/1.73 M 2
GLOBULIN SER CALC-MCNC: 2.8 G/DL (ref 1.9–3.5)
GLUCOSE SERPL-MCNC: 92 MG/DL (ref 65–99)
HCT VFR BLD AUTO: 38 % (ref 42–52)
HGB BLD-MCNC: 12.6 G/DL (ref 14–18)
IMM GRANULOCYTES # BLD AUTO: 0.03 K/UL (ref 0–0.11)
IMM GRANULOCYTES NFR BLD AUTO: 0.3 % (ref 0–0.9)
LV EJECT FRACT  99904: 60
LYMPHOCYTES # BLD AUTO: 2.18 K/UL (ref 1–4.8)
LYMPHOCYTES NFR BLD: 24.2 % (ref 22–41)
MAGNESIUM SERPL-MCNC: 2 MG/DL (ref 1.5–2.5)
MCH RBC QN AUTO: 31.9 PG (ref 27–33)
MCHC RBC AUTO-ENTMCNC: 33.2 G/DL (ref 32.3–36.5)
MCV RBC AUTO: 96.2 FL (ref 81.4–97.8)
MONOCYTES # BLD AUTO: 1.09 K/UL (ref 0–0.85)
MONOCYTES NFR BLD AUTO: 12.1 % (ref 0–13.4)
NEUTROPHILS # BLD AUTO: 5.04 K/UL (ref 1.82–7.42)
NEUTROPHILS NFR BLD: 55.9 % (ref 44–72)
NRBC # BLD AUTO: 0 K/UL
NRBC BLD-RTO: 0 /100 WBC (ref 0–0.2)
PATHOLOGY CONSULT NOTE: NORMAL
PLATELET # BLD AUTO: 240 K/UL (ref 164–446)
PMV BLD AUTO: 11.2 FL (ref 9–12.9)
POTASSIUM SERPL-SCNC: 5 MMOL/L (ref 3.6–5.5)
PROT SERPL-MCNC: 6.3 G/DL (ref 6–8.2)
RBC # BLD AUTO: 3.95 M/UL (ref 4.7–6.1)
SODIUM SERPL-SCNC: 138 MMOL/L (ref 135–145)
WBC # BLD AUTO: 9 K/UL (ref 4.8–10.8)

## 2024-05-24 PROCEDURE — 88305 TISSUE EXAM BY PATHOLOGIST: CPT

## 2024-05-24 PROCEDURE — 160009 HCHG ANES TIME/MIN: Performed by: INTERNAL MEDICINE

## 2024-05-24 PROCEDURE — 93306 TTE W/DOPPLER COMPLETE: CPT | Mod: 26 | Performed by: INTERNAL MEDICINE

## 2024-05-24 PROCEDURE — 160035 HCHG PACU - 1ST 60 MINS PHASE I: Performed by: INTERNAL MEDICINE

## 2024-05-24 PROCEDURE — 0DBN8ZX EXCISION OF SIGMOID COLON, VIA NATURAL OR ARTIFICIAL OPENING ENDOSCOPIC, DIAGNOSTIC: ICD-10-PCS | Performed by: INTERNAL MEDICINE

## 2024-05-24 PROCEDURE — 160002 HCHG RECOVERY MINUTES (STAT): Performed by: INTERNAL MEDICINE

## 2024-05-24 PROCEDURE — 700102 HCHG RX REV CODE 250 W/ 637 OVERRIDE(OP): Performed by: STUDENT IN AN ORGANIZED HEALTH CARE EDUCATION/TRAINING PROGRAM

## 2024-05-24 PROCEDURE — 160203 HCHG ENDO MINUTES - 1ST 30 MINS LEVEL 4: Performed by: INTERNAL MEDICINE

## 2024-05-24 PROCEDURE — 93306 TTE W/DOPPLER COMPLETE: CPT

## 2024-05-24 PROCEDURE — 45331 SIGMOIDOSCOPY AND BIOPSY: CPT | Performed by: INTERNAL MEDICINE

## 2024-05-24 PROCEDURE — A9270 NON-COVERED ITEM OR SERVICE: HCPCS

## 2024-05-24 PROCEDURE — 700101 HCHG RX REV CODE 250: Performed by: NURSE PRACTITIONER

## 2024-05-24 PROCEDURE — A9270 NON-COVERED ITEM OR SERVICE: HCPCS | Performed by: STUDENT IN AN ORGANIZED HEALTH CARE EDUCATION/TRAINING PROGRAM

## 2024-05-24 PROCEDURE — 36415 COLL VENOUS BLD VENIPUNCTURE: CPT

## 2024-05-24 PROCEDURE — 80053 COMPREHEN METABOLIC PANEL: CPT

## 2024-05-24 PROCEDURE — 770001 HCHG ROOM/CARE - MED/SURG/GYN PRIV*

## 2024-05-24 PROCEDURE — 700111 HCHG RX REV CODE 636 W/ 250 OVERRIDE (IP): Performed by: STUDENT IN AN ORGANIZED HEALTH CARE EDUCATION/TRAINING PROGRAM

## 2024-05-24 PROCEDURE — C9113 INJ PANTOPRAZOLE SODIUM, VIA: HCPCS | Performed by: STUDENT IN AN ORGANIZED HEALTH CARE EDUCATION/TRAINING PROGRAM

## 2024-05-24 PROCEDURE — 85025 COMPLETE CBC W/AUTO DIFF WBC: CPT

## 2024-05-24 PROCEDURE — 83735 ASSAY OF MAGNESIUM: CPT

## 2024-05-24 PROCEDURE — 160048 HCHG OR STATISTICAL LEVEL 1-5: Performed by: INTERNAL MEDICINE

## 2024-05-24 PROCEDURE — 700102 HCHG RX REV CODE 250 W/ 637 OVERRIDE(OP)

## 2024-05-24 PROCEDURE — 99232 SBSQ HOSP IP/OBS MODERATE 35: CPT | Mod: GC | Performed by: INTERNAL MEDICINE

## 2024-05-24 RX ORDER — DEXMEDETOMIDINE HYDROCHLORIDE 100 UG/ML
INJECTION, SOLUTION INTRAVENOUS PRN
Status: DISCONTINUED | OUTPATIENT
Start: 2024-05-24 | End: 2024-05-24 | Stop reason: SURG

## 2024-05-24 RX ORDER — SODIUM CHLORIDE, SODIUM LACTATE, POTASSIUM CHLORIDE, CALCIUM CHLORIDE 600; 310; 30; 20 MG/100ML; MG/100ML; MG/100ML; MG/100ML
INJECTION, SOLUTION INTRAVENOUS
Status: DISCONTINUED | OUTPATIENT
Start: 2024-05-24 | End: 2024-05-24 | Stop reason: SURG

## 2024-05-24 RX ORDER — ONDANSETRON 2 MG/ML
4 INJECTION INTRAMUSCULAR; INTRAVENOUS
Status: DISCONTINUED | OUTPATIENT
Start: 2024-05-24 | End: 2024-05-24 | Stop reason: HOSPADM

## 2024-05-24 RX ORDER — SODIUM CHLORIDE, SODIUM LACTATE, POTASSIUM CHLORIDE, CALCIUM CHLORIDE 600; 310; 30; 20 MG/100ML; MG/100ML; MG/100ML; MG/100ML
INJECTION, SOLUTION INTRAVENOUS CONTINUOUS
Status: DISCONTINUED | OUTPATIENT
Start: 2024-05-24 | End: 2024-05-24 | Stop reason: HOSPADM

## 2024-05-24 RX ADMIN — SODIUM PHOSPHATE 133 ML: 7; 19 ENEMA RECTAL at 08:54

## 2024-05-24 RX ADMIN — PANTOPRAZOLE SODIUM 40 MG: 40 INJECTION, POWDER, FOR SOLUTION INTRAVENOUS at 18:28

## 2024-05-24 RX ADMIN — SODIUM CHLORIDE, POTASSIUM CHLORIDE, SODIUM LACTATE AND CALCIUM CHLORIDE: 600; 310; 30; 20 INJECTION, SOLUTION INTRAVENOUS at 12:08

## 2024-05-24 RX ADMIN — CEFUROXIME AXETIL 750 MG: 500 TABLET ORAL at 04:33

## 2024-05-24 RX ADMIN — ACETAMINOPHEN 650 MG: 325 TABLET, FILM COATED ORAL at 22:18

## 2024-05-24 RX ADMIN — SODIUM PHOSPHATE 133 ML: 7; 19 ENEMA RECTAL at 09:36

## 2024-05-24 RX ADMIN — DONEPEZIL HYDROCHLORIDE 5 MG: 5 TABLET, FILM COATED ORAL at 22:16

## 2024-05-24 RX ADMIN — DEXMEDETOMIDINE HYDROCHLORIDE 10 MCG: 100 INJECTION, SOLUTION INTRAVENOUS at 12:08

## 2024-05-24 RX ADMIN — AMOXICILLIN AND CLAVULANATE POTASSIUM 1 TABLET: 875; 125 TABLET, FILM COATED ORAL at 18:27

## 2024-05-24 RX ADMIN — CYANOCOBALAMIN TAB 500 MCG 1000 MCG: 500 TAB at 04:33

## 2024-05-24 RX ADMIN — METRONIDAZOLE 500 MG: 500 TABLET ORAL at 04:33

## 2024-05-24 RX ADMIN — AMOXICILLIN AND CLAVULANATE POTASSIUM 1 TABLET: 875; 125 TABLET, FILM COATED ORAL at 09:36

## 2024-05-24 RX ADMIN — PROPOFOL 100 MG: 10 INJECTION, EMULSION INTRAVENOUS at 12:08

## 2024-05-24 RX ADMIN — DOXEPIN HYDROCHLORIDE 10 MG: 10 CAPSULE ORAL at 23:08

## 2024-05-24 RX ADMIN — PANTOPRAZOLE SODIUM 40 MG: 40 INJECTION, POWDER, FOR SOLUTION INTRAVENOUS at 04:33

## 2024-05-24 ASSESSMENT — ENCOUNTER SYMPTOMS
NAUSEA: 0
SHORTNESS OF BREATH: 0
NERVOUS/ANXIOUS: 0
WEIGHT LOSS: 0
HEADACHES: 0
FEVER: 0
CHILLS: 0
DOUBLE VISION: 0
BACK PAIN: 0
DIZZINESS: 0
VOMITING: 0
WEAKNESS: 0
SORE THROAT: 0
CONSTIPATION: 0
MYALGIAS: 0
PALPITATIONS: 0
SPUTUM PRODUCTION: 0
ABDOMINAL PAIN: 1
DIARRHEA: 0
BLURRED VISION: 0
COUGH: 0

## 2024-05-24 ASSESSMENT — LIFESTYLE VARIABLES: SUBSTANCE_ABUSE: 0

## 2024-05-24 ASSESSMENT — PAIN DESCRIPTION - PAIN TYPE
TYPE: ACUTE PAIN
TYPE: ACUTE PAIN;CHRONIC PAIN
TYPE: ACUTE PAIN

## 2024-05-24 ASSESSMENT — PAIN SCALES - GENERAL: PAIN_LEVEL: 0

## 2024-05-24 NOTE — ANESTHESIA PREPROCEDURE EVALUATION
Case: [id number] Date/Time: 05/24/24 0930    Procedure: SIGMOIDOSCOPY, FLEXIBLE (Anus)    Anesthesia type: MAC    Pre-op diagnosis: Colitis, hematochezia    Location: [facility] ROOM 26 / SURGERY SAME DAY [facility]    Surgeons: Suzie Almonte M.D.            Relevant Problems   PULMONARY   (positive) COPD (chronic obstructive pulmonary disease) (HCC)   (positive) Other emphysema (HCC)      CARDIAC   (positive) AAA (abdominal aortic aneurysm) (HCC)   (positive) Abdominal aortic aneurysm (HCC)   (positive) Ischemic bowel disease (HCC)   (positive) Renal infarct (HCC)      GI   (positive) Gastroesophageal reflux disease without esophagitis         (positive) Stage 3a chronic kidney disease      Other   (positive) Arthritis   (positive) Arthritis of shoulder       Physical Exam    Airway   Mallampati: II  TM distance: >3 FB  Neck ROM: full       Cardiovascular - normal exam  Rhythm: regular  Rate: normal  (-) murmur     Dental - normal exam           Pulmonary - normal exam  Breath sounds clear to auscultation     Abdominal    Neurological - normal exam                   Anesthesia Plan    ASA 2       Plan - general       Airway plan will be natural airway        Plan Factors:   Patient was previously instructed to abstain from smoking on day of procedure.  Patient did not smoke on day of procedure.      Induction: intravenous          Informed Consent:    Anesthetic plan and risks discussed with patient.    Use of blood products discussed with: patient whom.

## 2024-05-24 NOTE — PROGRESS NOTES
"  DATE: 5/24/2024    Hospital Day 4  ischemic colitis    INTERVAL EVENTS:  Colonoscopy completed   GI recommendations reviewed  Tolerating regular diet    REVIEW OF SYSTEMS:  Comprehensive review of systems is negative with the exception of the aforementioned HPI, PMH, and PSH bullets in accordance with CMS guidelines.    PHYSICAL EXAMINATION:  Vital Signs: /83   Pulse 83   Temp 36.6 °C (97.9 °F) (Temporal)   Resp 18   Ht 1.676 m (5' 6\")   Wt 45.6 kg (100 lb 8.5 oz)   SpO2 98%   Physical Exam  Vitals reviewed.   Constitutional:       Appearance: He is not toxic-appearing.   HENT:      Mouth/Throat:      Mouth: Mucous membranes are moist.      Pharynx: Oropharynx is clear.   Eyes:      Conjunctiva/sclera: Conjunctivae normal.   Pulmonary:      Effort: Pulmonary effort is normal.   Abdominal:      General: There is no distension.      Palpations: Abdomen is soft.      Tenderness: There is no abdominal tenderness.   Neurological:      Mental Status: He is alert.         LABORATORY VALUES:  Recent Labs     05/22/24  0157 05/22/24  0626 05/22/24  1025 05/23/24  0129 05/24/24  0247   WBC 9.9  --   --  7.5 9.0   RBC 3.50*  --   --  3.83* 3.95*   HEMOGLOBIN 11.0*   < > 12.3* 12.0* 12.6*   HEMATOCRIT 32.8*   < > 36.6* 36.0* 38.0*   MCV 93.7  --   --  94.0 96.2   MCH 31.4  --   --  31.3 31.9   MCHC 33.5  --   --  33.3 33.2   RDW 45.2  --   --  45.2 46.3   PLATELETCT 189  --   --  212 240   MPV 11.2  --   --  11.2 11.2    < > = values in this interval not displayed.     Recent Labs     05/22/24  0157 05/23/24  0129 05/24/24  0247   SODIUM 141 140 138   POTASSIUM 3.8 3.9 5.0   CHLORIDE 111 107 105   CO2 20 22 23   GLUCOSE 83 86 92   BUN 11 8 19   CREATININE 0.62 0.75 0.93   CALCIUM 7.8* 8.4* 8.5     Recent Labs     05/22/24  0157 05/23/24  0129 05/24/24  0247   ASTSGOT 13 15 15   ALTSGPT 7 8 7   TBILIRUBIN 0.3 0.4 0.2   ALKPHOSPHAT 55 60 63   GLOBULIN 2.5 2.4 2.8           IMAGING:  EC-ECHOCARDIOGRAM COMPLETE W/O " CONT   Final Result      CT-CTA COMPLETE THORACOABDOMINAL AORTA   Final Result         1.  4.1 cm infrarenal abdominal aortic aneurysm with stent graft in place.   2.  4.4 x 4.0 cm fusiform distal thoracic aortic aneurysm, radiographic follow-up and surveillance recommended as clinically appropriate.   3.  Emphysema   4.  Cholelithiasis   5.  Atherosclerosis and atherosclerotic coronary artery disease      OUTSIDE IMAGES-CT CHEST   Final Result      OUTSIDE IMAGES-CT ABDOMEN /PELVIS   Final Result          ASSESSMENT AND PLAN:  Abdominal pain resolved  Tolerating diet    - No current surgical needs identified  - Follow up with vascular, GI and PCP   - ACS will sign off, please call with any questions or concerns        ____________________________________     Swati Pozo A.P.R.N.    DD: 5/24/2024  3:21 PM

## 2024-05-24 NOTE — CARE PLAN
The patient is Stable - Low risk of patient condition declining or worsening    Shift Goals  Clinical Goals: NPO at midnight, continue oral antibiotics, promote safety  Patient Goals: food  Family Goals: CHRISTOFER    Progress made toward(s) clinical / shift goals:      Patient has remained NPO since midnight.   Patient tolerated dinner meal well. No episodes of loose stool post meal.   No bleeding again this shift.   Patient hemoglobin currently 12.6, continuing to trend upwards.     Problem: Knowledge Deficit - Standard  Goal: Patient and family/care givers will demonstrate understanding of plan of care, disease process/condition, diagnostic tests and medications  Description: Target End Date:  1-3 days or as soon as patient condition allows    Document in Patient Education    1.  Patient and family/caregiver oriented to unit, equipment, visitation policy and means for communicating concern  2.  Complete/review Learning Assessment  3.  Assess knowledge level of disease process/condition, treatment plan, diagnostic tests and medications  4.  Explain disease process/condition, treatment plan, diagnostic tests and medications  Outcome: Progressing  Note: Plan of care discussed with patient bedside, including medication regimen, and planned NPO status upon midnight for GI test.   Patient is very pleasantly confused and forgetful, requiring frequent reorientation, however, very redirectable.      Problem: Communication  Goal: The ability to communicate needs accurately and effectively will improve  Description: Target End Date:  End of day 1    1.  Assess ability to communicate and understand  2.  Provide augmentative or alternative methods of communication devices  3.  Use /language line as appropriate  4.  Collaborate with Speech Therapy as needed  Outcome: Progressing     Problem: Self Care  Goal: Patient will have the ability to perform ADLs independently or with assistance (bathe, groom, dress, toilet and  feed)  Description: Target End Date:  Prior to discharge or change in level of care    Document on ADL flowsheet    1.  Assess the capability and level of deficiency to perform ADLs  2.  Encourage family/care giver involvement  3.  Provide assistive devices  4.  Consider PT/OT evaluations  5.  Maintain support, give positive feedback, encourage self-care allowing extra time and verbal cuing as needed  6.  Avoid doing something for patients they can do themselves, but provide assistance as needed  7.  Assist in anticipating/planning individual needs  8.  Collaborate with Case Management and  to meet discharge needs  Outcome: Progressing     Problem: Fall Risk  Goal: Patient will remain free from falls  Description: Target End Date:  Prior to discharge or change in level of care    Document interventions on the [facility] [place] Fall Risk Assessment    1.  Assess for fall risk factors  2.  Implement fall precautions  Outcome: Progressing  Note: Bed in lowest position. Bed alarm in place and on. Patient forgetful of call light use and impulsive, however, following commands for safety.   Nursing staff charting nearby patient room to promote safety, along with frequent rounding.        Patient is not progressing towards the following goals:

## 2024-05-24 NOTE — OR NURSING
0615 Received report from TRACY Lopez RN. Will place pt on transport list around 0900 after am fleets enemas.     1035 Introduced self to patient and discussed plan of care. Unable to sign surgical consent , left voicemail for son to call back to obtain consent via phone. IV flushes without difficulty. Checked oxygen tank on [facility], 418 psi. Pre op complete.

## 2024-05-24 NOTE — OR NURSING
1227 - Pt to PACU 3 from OR.  Bedside report from anesthesiologist and RN.  Attached to monitoring, VSS, breathing is calm and unlabored.  6L mask, no signs pain or nausea.  BP lower but MAP of 60, fluids infusing.     1311 - Pt stable to go back to floor, Report to bedside RN Mauricio.  Pt more awake, on room air, denies pain or nausea.     1320 - Pt taken via gurney back to room with transport tech.  Pt has beanie on his head, no other belongings.

## 2024-05-24 NOTE — CARE PLAN
The patient is Watcher - Medium risk of patient condition declining or worsening    Shift Goals  Clinical Goals: NPO for procedure, safety and comfort, ABX,  Patient Goals: rest and sleep  Family Goals: sergio    Progress made toward(s) clinical / shift goals:        Problem: Knowledge Deficit - Standard  Goal: Patient and family/care givers will demonstrate understanding of plan of care, disease process/condition, diagnostic tests and medications  Note: Assess knowledge level of disease process and answer questions, explain tests, go over plan of care     Problem: Infection - Standard  Goal: Patient will remain free from infection  Note: Utilize Standard Precautions at all times, wash hands often, teach pt the infection controls     Problem: Fall Risk  Goal: Patient will remain free from falls  Note:   Assess for fall risk factors and implement fall precautions such as bed alarms and signs, hourly rounding, teach pt safety protocols           Patient is not progressing towards the following goals:

## 2024-05-24 NOTE — PROGRESS NOTES
"MEDICAL STUDENT NOTE  This note is intended for the purposes of medical student education and feedback only.   Please refer to the documentation by this patient's assigned medical practitioner for details of care and plans.    INTERNAL MEDICINE PROGRESS NOTE          PATIENT ID:  NAME:  Indio Cohen  MRN:               [id number]  YOB: 1945    Date of Admission: 5/20/2024     Attending: Nick Edmonds M.d.     Medical Student: Nam Dumont    Primary Care Physician:  None noted    HPI: Indio Cohen is a 78 y.o. male with hx of infrarenal AAA (s/p stent graft in 2013) admitted as direct transfer from [facility] for ischemic bowel disease and left renal infarc on hospital day 4. He initially presented to the transferring facility with complaints of left sided abdominal pain and bright red blood per rectum x3 days. The patient denied any lightheadedness or dizziness. The patient was ultimately transferred to obtain General Surgery and Vascular consults.      Labs at the outlying facility revealed a WBC of 17 and he was started on Zosyn. CTA Abdomen pelvis also showed severe wall thickening of sigmoid colon and rectum consistent with colitis and proctitis. This could be due to infection vs. Inflammatory vs. Ischemic etiology. CTA also revealed 20% infarct vs ischemia of left kidney    Interval Problem Update  GI taking patient for flexible sigmoidoscopy with biopsies today    H&H stable at 12.6 and 38.0    I have personally seen and examined the patient at bedside. I discussed the plan of care with patient, bedside RN, , and pharmacy.    SUBJECTIVE:   No acute events overnight, patient reports to feel the same. He denies any abdominal pain. No blood per rectum. Continues to ask for solid food instead of his liquid diet.     OBJECTIVE:    PHYSICAL EXAM:  /85   Pulse 81   Temp 37.1 °C (98.8 °F) (Temporal)   Resp 18   Ht 1.676 m (5' 6\")   Wt 45.6 kg (100 lb 8.5 oz)   SpO2 97%   " BMI 16.23 kg/m²   Physical Exam  Constitutional:       Appearance: Normal appearance.   Cardiovascular:      Rate and Rhythm: Normal rate and regular rhythm.   Abdominal:      General: Abdomen is flat.      Palpations: Abdomen is soft.      Tenderness: There is abdominal tenderness (left lower quadrant).   Musculoskeletal:         General: No swelling.   Skin:     General: Skin is warm and dry.   Neurological:      Mental Status: He is alert and oriented to person, place, and time.   Psychiatric:         Mood and Affect: Mood normal.         Behavior: Behavior normal.           Intake/Output Summary (Last 24 hours) at 5/24/2024 0623  Last data filed at 5/24/2024 0400  Gross per 24 hour   Intake 1400 ml   Output 0 ml   Net 1400 ml       LABS:  Recent Labs     05/22/24  0157 05/22/24  0626 05/22/24  1025 05/23/24  0129 05/24/24  0247   WBC 9.9  --   --  7.5 9.0   RBC 3.50*  --   --  3.83* 3.95*   HEMOGLOBIN 11.0*   < > 12.3* 12.0* 12.6*   HEMATOCRIT 32.8*   < > 36.6* 36.0* 38.0*   MCV 93.7  --   --  94.0 96.2   MCH 31.4  --   --  31.3 31.9   RDW 45.2  --   --  45.2 46.3   PLATELETCT 189  --   --  212 240   MPV 11.2  --   --  11.2 11.2   NEUTSPOLYS 65.70  --   --  53.60 55.90   LYMPHOCYTES 20.10*  --   --  26.60 24.20   MONOCYTES 10.30  --   --  11.30 12.10   EOSINOPHILS 2.90  --   --  7.20* 6.50   BASOPHILS 0.70  --   --  0.90 1.00    < > = values in this interval not displayed.     Recent Labs     05/22/24  0157 05/23/24  0129 05/24/24  0247   SODIUM 141 140 138   POTASSIUM 3.8 3.9 5.0   CHLORIDE 111 107 105   CO2 20 22 23   BUN 11 8 19   CREATININE 0.62 0.75 0.93   CALCIUM 7.8* 8.4* 8.5   MAGNESIUM 2.0 2.0 2.0   ALBUMIN 3.0* 3.5 3.5     Estimated GFR/CRCL = CrCl cannot be calculated (Unknown ideal weight.).  Recent Labs     05/22/24  0157 05/23/24  0129 05/24/24  0247   GLUCOSE 83 86 92     Recent Labs     05/22/24  0157 05/23/24  0129 05/24/24  0247   ASTSGOT 13 15 15   ALTSGPT 7 8 7   TBILIRUBIN 0.3 0.4 0.2    ALKPHOSPHAT 55 60 63   GLOBULIN 2.5 2.4 2.8       IMAGING:  CT-CTA COMPLETE THORACOABDOMINAL AORTA   Final Result         1.  4.1 cm infrarenal abdominal aortic aneurysm with stent graft in place.   2.  4.4 x 4.0 cm fusiform distal thoracic aortic aneurysm, radiographic follow-up and surveillance recommended as clinically appropriate.   3.  Emphysema   4.  Cholelithiasis   5.  Atherosclerosis and atherosclerotic coronary artery disease      OUTSIDE IMAGES-CT CHEST   Final Result      OUTSIDE IMAGES-CT ABDOMEN /PELVIS   Final Result      EC-ECHOCARDIOGRAM COMPLETE W/O CONT    (Results Pending)       CULTURES:   Results       Procedure Component Value Units Date/Time    CULTURE STOOL [[id number]]     Order Status: No result Specimen: Stool     C Diff by PCR rflx Toxin [[id number]]     Order Status: Canceled Specimen: Stool     BLOOD CULTURE [[id number]] Collected: 05/20/24 2147    Order Status: Completed Specimen: Blood from Peripheral Updated: 05/21/24 0641     Significant Indicator NEG     Source BLD     Site PERIPHERAL     Culture Result No Growth  Note: Blood cultures are incubated for 5 days and  are monitored continuously.Positive blood cultures  are called to the RN and reported as soon as  they are identified.      Narrative:      Left AC    BLOOD CULTURE [[id number]] Collected: 05/20/24 2147    Order Status: Completed Specimen: Blood from Peripheral Updated: 05/21/24 0641     Significant Indicator NEG     Source BLD     Site PERIPHERAL     Culture Result No Growth  Note: Blood cultures are incubated for 5 days and  are monitored continuously.Positive blood cultures  are called to the RN and reported as soon as  they are identified.      Narrative:      Right AC            MEDS:  Current Facility-Administered Medications   Medication Last Admin    sodium phosphate (Fleet) enema 133 mL      ondansetron (Zofran) syringe/vial injection 4 mg      [START ON 5/28/2024] alendronate (Fosamax) tablet 70 mg       cyanocobalamin (Vitamin B-12) tablet 1,000 mcg 1,000 mcg at 05/24/24 0433    donepezil (Aricept) tablet 5 mg 5 mg at 05/23/24 2116    doxepin (SINEquan) capsule 10 mg 10 mg at 05/23/24 2116    pantoprazole (Protonix) injection 40 mg 40 mg at 05/24/24 0433    acetaminophen (Tylenol) tablet 650 mg         ASSESSMENT/PLAN:  78 y.o. male with hx of left infrarenal AAA (s/p stent graft 2013) admitted for ischemic bowel disease and left renal infarct     #Ischemic Bowel Disease  Patient with left upper quadrant and bright red blood per rectum x3 days  CTA of abdomen/pelvis with severe wall thickening in the sigmoid colon and rectum consistent with colitis and proctitis. This could be due to infectious vs. Inflammatory vs ischemic etiology. JOHNY not clearly seen  Follow up CTA-Chest with patient celiac, SMA, and JOHNY. No dissection.  Continue Pantoprazole 40 mg IV BID  H&H stable this AM, will monitor q1day  WBC previously 17 at outlying facility, improved to 9.0 this AM  Will switch to PO Augmentin 875-125 mg  General Surgery consulted and no surgical intervention  Continue to monitor with abdominal exams     #GI Bleed  Continue Pantoprazole 40 mg IV BID  H&H stable this AM at 12.6 and 38.0 this AM, will monitor q1day  Dr. Mendosa (GI) consulted and recommends stool culture and C. Difficile testing  After 2 days, patient unable to give stool sample  Low suspicion for C. Diff as patient does not have large volume watery stools  After speaking with GI, plan for flexible sigmoidoscopy with biopsies today  Patient kept NPO since last night and plan for 2 fleet enemas prior to procedure       #Renal Infarct of Left Kidney  CTA of Abdomen/Pelvis showing 20% ischemia or partial infarction of left kidney  Vascular consulted and has no indications for emergent surgical intervention or anticoagulation  Vascular believes this is secondary to stent graft, but recommends follow up outpatient for surveillance with whoever placed the  stent graft  Vascular does recommends Echo   Echo showing LVEF of 60% with no obvious valvular abnormalities     #AAA  CTA-chest with 4.1 cm infrarenal AAA with stent  Stable from December 2023  Continue surveillance monitoring outpatient    Core Measures:  Fluids: None  Lines: Peripheral IV  Abx: PO Flagyl and Cefuroxime   Diet: Low fiber, lactose free for 2 weeks per GI recommendations  PPX: pharmacologic prophylaxis contraindicated due to GI bleed and upcoming flexible sigmoidoscopy    CODE Status: Full Code      Disposition  Patient is not medically cleared for discharge.   Anticipate discharge to to home with close outpatient follow-up.  I have placed the appropriate orders for post-discharge needs.      Nam Dumont, MS3  [employer]

## 2024-05-24 NOTE — PROCEDURES
OPERATIVE REPORT        PATIENT: Indio Cohen  1945      PREOPERATIVE DIAGNOSIS/INDICATION: abdominal pain, hematochezia, rectosigmoiditis on imaging    POSTOPERATIVE DIAGNOSES: probable ischemic colitis    PROCEDURE: Flexible Sigmoidoscopy with biopsies     PHYSICIAN: Suzie Almonte MD     CONSENT:  OBTAINED. The risks, benefits and alternatives of the procedure were discussed in details. The risks include and are not limited to bleeding, infection, perforation, missed lesions, and sedations risks (cardiopulmonary compromise and allergic reaction to medications).    ANESTHESIA:  Per anesthesiologist.    LOCATION: [facility]    DESCRIPTION:  The patient presented to the procedure room.  After routine checkup was performed, patient was brought into endoscopy suite.  Patient was placed on his left lateral decubitus position.  Patient was sedated by anesthesia. Vital signs were monitored throughout procedure.  Oxygenation support was provided throughout procedure. Digital rectal examination was performed.  Then, a gastroscope was inserted into patient's anus, advanced to the descending colon.     [place]  Left: 2        RECTUM: normal mucosa    SIGMOID COLON: patchy erythema distally with ischemic appearing lesions at 30 cm.  Mucosa edematous with ulcerations and purplish mucosal.  Biopsies taken.    DESCENDING COLON: extending to 40 cm due to poor prep above this.    RECOMMENDATIONS:  Follow up path  Low fiber, lactose free diet for 2 weeks  +/- whether antibiotics needed

## 2024-05-24 NOTE — PROGRESS NOTES
[facility] Internal Medicine Daily Progress Note    Date of Service  5/24/2024    [facility] Team: R IM Green Team   Attending: Nick Edmonds M.d.  Senior Resident: Dr. Izaguirre  Intern:  Dr. Novak  Contact Number: [phone]    Chief Complaint  Indio Cohen is a 78 y.o. male admitted 5/20/2024 with abdominal pain and bright red blood per rectum.    Hospital Course  Indio Cohen is a 78 y.o. male who presented on 5/20/2024 as a transfer from outside facility.  Patient initially presented to outside facility complaining of abdominal pain and bright red blood per rectum.  Stated that the pain and bloody bowel movements were going on for 2 to 3 days now.  Denies any fevers, chills, nausea, vomiting.  He states that the pain is worse in the left upper quadrant.  CT angiography of the abdomen pelvis with IV contrast was obtained.  There is severe wall thickening the sigmoid colon and rectum consistent with colitis and proctitis. This could be due to infectious versus inflammatory versus ischemic etiology.  Inferior mesenteric artery is not clearly seen.  There is a 4.1 cm abdominal aortic aneurysm with stent graft present that appears to be stable compared to December 2023.  There is a patch uptake of contrast in the posterior aspect of the left kidney extending into the upper pole of the left kidney.  This could be due to ischemia or partial infarction approximately 20% of the left kidney.   CT scan of the chest shows moderate emphysematous changes.  Coronary artery disease.  There is moderate size hiatal hernia.  4.8 mm nodule at the left lung base. Labs were notable for leukocytosis greater than 17,000.  Hemoglobin appears to be stable at 14.  He had a creatinine level of 1.32, which appears to be near his baseline.  Lactic acid was 1.3.  Urinalysis unremarkable.   Case is been discussed with both general surgery and vascular surgery.  No recommended surgical intervention at this time.  GI consulted: Order is not  stool culture, C. difficile and start full liquid diet. If stool studies are negative consider flexible sigmoidoscopy for biopsies versus full colonoscopy based on patient's clinical course.         Interval Problem Update  No acute events overnight.  Patient was taken for flexible sigmoidoscopy with GI team.  PT OT recommended home health but per  is no [facility].  Discussed with son of the patient about considering a SNF but he refers that patient does not want it.  Switching antibiotics to Augmentin p.o.    I have discussed this patient's plan of care and discharge plan at IDT rounds today with Case Management, Nursing, Nursing leadership, and other members of the IDT team.    Consultants/Specialty  GI  General surgery  Vascular surgery    Code Status  Full Code    Disposition  The patient is not medically cleared for discharge to home or a post-acute facility.      I have placed the appropriate orders for post-discharge needs.    Review of Systems  Review of Systems   Constitutional:  Negative for chills, fever, malaise/fatigue and weight loss.   HENT:  Negative for congestion and sore throat.    Eyes:  Negative for blurred vision and double vision.   Respiratory:  Negative for cough, sputum production and shortness of breath.    Cardiovascular:  Negative for chest pain, palpitations and leg swelling.   Gastrointestinal:  Positive for abdominal pain (Left-sided). Negative for constipation, diarrhea, nausea and vomiting.   Genitourinary:  Negative for dysuria.   Musculoskeletal:  Negative for back pain, joint pain and myalgias.   Neurological:  Negative for dizziness, weakness and headaches.   Psychiatric/Behavioral:  Negative for substance abuse. The patient is not nervous/anxious.         Physical Exam  Temp:  [36.2 °C (97.2 °F)-37.1 °C (98.8 °F)] (P) 36.3 °C (97.3 °F)  Pulse:  [58-83] (P) 66  Resp:  [16-18] (P) 16  BP: ()/(51-85) (P) 96/55  SpO2:  [93 %-100 %] (P) 94 %    Physical  Exam  Constitutional:       General: He is not in acute distress.     Appearance: Normal appearance. He is not ill-appearing.   HENT:      Head: Normocephalic and atraumatic.      Nose: Nose normal.      Mouth/Throat:      Mouth: Mucous membranes are moist.   Eyes:      Extraocular Movements: Extraocular movements intact.      Conjunctiva/sclera: Conjunctivae normal.      Pupils: Pupils are equal, round, and reactive to light.   Cardiovascular:      Rate and Rhythm: Normal rate and regular rhythm.      Pulses: Normal pulses.      Heart sounds: Normal heart sounds.   Pulmonary:      Effort: Pulmonary effort is normal. No respiratory distress.      Breath sounds: Normal breath sounds.   Chest:      Chest wall: No tenderness.   Abdominal:      General: Bowel sounds are normal. There is no distension.      Palpations: Abdomen is soft.      Tenderness: There is abdominal tenderness (Left-sided). There is no right CVA tenderness or left CVA tenderness.   Musculoskeletal:         General: No swelling or tenderness. Normal range of motion.      Cervical back: Normal range of motion and neck supple.      Right lower leg: No edema.      Left lower leg: No edema.   Skin:     General: Skin is warm.      Capillary Refill: Capillary refill takes less than 2 seconds.      Coloration: Skin is not jaundiced or pale.   Neurological:      General: No focal deficit present.      Mental Status: He is alert and oriented to person, place, and time.      Cranial Nerves: No cranial nerve deficit.      Sensory: No sensory deficit.      Motor: No weakness.   Psychiatric:         Mood and Affect: Mood normal.         Fluids    Intake/Output Summary (Last 24 hours) at 5/24/2024 1357  Last data filed at 5/24/2024 1229  Gross per 24 hour   Intake 1000 ml   Output 0 ml   Net 1000 ml       Laboratory  Recent Labs     05/22/24  0157 05/22/24  0626 05/22/24  1025 05/23/24  0129 05/24/24  0247   WBC 9.9  --   --  7.5 9.0   RBC 3.50*  --   --  3.83*  3.95*   HEMOGLOBIN 11.0*   < > 12.3* 12.0* 12.6*   HEMATOCRIT 32.8*   < > 36.6* 36.0* 38.0*   MCV 93.7  --   --  94.0 96.2   MCH 31.4  --   --  31.3 31.9   MCHC 33.5  --   --  33.3 33.2   RDW 45.2  --   --  45.2 46.3   PLATELETCT 189  --   --  212 240   MPV 11.2  --   --  11.2 11.2    < > = values in this interval not displayed.     Recent Labs     05/22/24  0157 05/23/24  0129 05/24/24  0247   SODIUM 141 140 138   POTASSIUM 3.8 3.9 5.0   CHLORIDE 111 107 105   CO2 20 22 23   GLUCOSE 83 86 92   BUN 11 8 19   CREATININE 0.62 0.75 0.93   CALCIUM 7.8* 8.4* 8.5                     Imaging  EC-ECHOCARDIOGRAM COMPLETE W/O CONT   Final Result      CT-CTA COMPLETE THORACOABDOMINAL AORTA   Final Result         1.  4.1 cm infrarenal abdominal aortic aneurysm with stent graft in place.   2.  4.4 x 4.0 cm fusiform distal thoracic aortic aneurysm, radiographic follow-up and surveillance recommended as clinically appropriate.   3.  Emphysema   4.  Cholelithiasis   5.  Atherosclerosis and atherosclerotic coronary artery disease      OUTSIDE IMAGES-CT CHEST   Final Result      OUTSIDE IMAGES-CT ABDOMEN /PELVIS   Final Result           Assessment/Plan  Problem Representation:    * Ischemic bowel disease (HCC)- (present on admission)  Assessment & Plan  Several day history of bright red blood per rectum  CT angiography of the abdomen pelvis with IV contrast was obtained.  There is severe wall thickening the sigmoid colon and rectum consistent with colitis and proctitis.  This could be due to infectious versus inflammatory versus ischemic etiology.  Inferior mesenteric artery is not clearly seen.    -Pantoprazole 40mg IV BID  -H/H [id number]   -Transfuse for hemoglobin less than 7  -Empirically started the patient on IV Zosyn  -Switching to cefuroxime plus metronidazole, per ID Pharm recommendations  -5/24 switch antibiotics to Augmentin p.o.  -FOBT pending  -General Surgery consulted: No surgical intervention at this point   -Vascular  surgery consulted: No concerns about renal infarct. Do not recommend systemic anticoagulation in this patient for that indication and did not recommend any procedure at this time. Recommend getting an echocardiogram to rule out thromboembolic status along with the CTA of his chest as well  -GI consulted will perform flexible sigmoidoscopy on 5/24.     Advanced care planning/counseling discussion  Assessment & Plan  I spent 17 minutes at bedside with nursing staff present with patient discussing work-up, results, diagnosis, prognosis. Talked to patient about his COPD, severe protein calorie malnutrition, hx of AAA, now presenting with ischemic bowel/infectious and a renal infarct    CODE STATUS discussed with patient and wants to be Full Code   - PT OT recommended home health but per  is no [facility].  Discussed with son of the patient about considering a SNF but he refers that patient does not want it.      Renal infarct (HCC)  Assessment & Plan  There is a patch uptake of contrast in the posterior aspect of the left kidney extending into the upper pole of the left kidney.  This could be due to ischemia or partial infarction approximately 20% of the left kidney.  Vascular surgery has been consulted.  No indication for any emergent surgical intervention  Further discussion was had with vascular surgeon.  He does not need anticoagulation from the renal infarct perspective.  This all likely secondary to the placement of the stent graft.  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  His SMA appears to be okay.  Recommending management for his GI bleed versus colitis.  Patient will need follow-up with whoever placed the stent graft for surveillance.   He did recommend getting an echocardiogram to rule out thromboembolic status along with the CTA of his chest as well  -Echo pending  -CTA of the chest wall remarkable for 4.1 cm infrarenal abdominal aortic aneurysm  with stent graft in place, 4.4 x 4.0 cm fusiform distal thoracic aortic aneurysm, radiographic follow-up and surveillance recommended as clinically appropriate. Emphysema. Cholelithiasis. Atherosclerosis and atherosclerotic coronary artery disease.       GI bleed  Assessment & Plan  Patient states he is happy  Blood per rectum for last 2 to 3 days.'s likely secondary to the colitis versus proctitis seen on CT imaging  -Pantoprazole 40 mg IV twice daily  -Hemoglobin hematocrit every 4 hours  -Transfuse for hemoglobin less than 7  -GI consulted: Order is not stool culture, C. difficile and start full liquid diet. If stool studies are negative consider flexible sigmoidoscopy for biopsies versus full colonoscopy based on patient's clinical course.     Severe protein-calorie malnutrition (HCC)  Assessment & Plan  BMI 15.91  -Nutrition consult placed    Smoking- (present on admission)  Assessment & Plan  Patient smokes < 1PPD.  Nicotine patch and/or gum offered.   I discussed cessation with patient including starting on nicotine patch and/or gum on discharge.  I also discussed medications to help with cessation with patient including Wellbutrin and Chantix, offered .  Smoking cessation discussed with patient for 5 minutes.      AAA (abdominal aortic aneurysm) (HCC)- (present on admission)  Assessment & Plan  History of 4.1cm AAA seen on CTA.   -Graft in place   -Appears to be stable   -No hemodynamically instability         VTE prophylaxis: SCDs/TEDs    I have performed a physical exam and reviewed and updated ROS and Plan today (5/24/2024). In review of yesterday's note (5/23/2024), there are no changes except as documented above.    Linda Novak MD  Internal Medicine PGY-1

## 2024-05-24 NOTE — ANESTHESIA POSTPROCEDURE EVALUATION
Patient: Indio Cohen    Procedure Summary       Date: 05/24/24 Room / Location: [facility] ROOM 26 / SURGERY SAME DAY [facility]    Anesthesia Start: 1208 Anesthesia Stop: 1230    Procedures:       SIGMOIDOSCOPY, FLEXIBLE (Anus)      COLONOSCOPY, WITH BIOPSY (Anus) Diagnosis: (LEFT sided Colitis)    Surgeons: Suzie Almonte M.D. Responsible Provider: Elpidio Gregg M.D.    Anesthesia Type: general ASA Status: 2            Final Anesthesia Type: general  Last vitals  BP   Blood Pressure : 128/68    Temp   36.2 °C (97.2 °F)    Pulse   72   Resp   16    SpO2   98 %      Anesthesia Post Evaluation    Patient location during evaluation: PACU  Patient participation: complete - patient participated  Level of consciousness: awake and alert  Pain score: 0    Airway patency: patent  Anesthetic complications: no  Cardiovascular status: hemodynamically stable  Respiratory status: acceptable  Hydration status: euvolemic    PONV: none          No notable events documented.     Nurse Pain Score: 0 (NPRS)

## 2024-05-24 NOTE — THERAPY
Physical Therapy Contact Note    Patient Name: Indio Cohen  Age:  78 y.o., Sex:  male  Medical Record #: [id number]  Today's Date: 5/24/2024 05/24/24 1229   Treatment Variance   Reason For Missed Therapy Medical - Patient  in Procedure   Interdisciplinary Plan of Care Collaboration   Collaboration Comments An attempt to see patient for PT session was to be made this PM. Patient currently off the floor for sigmoidoscopy. PT to follow up at a later date as able & appropriate.   Session Information   Date / Session Number  5/21-1(1/3, 5/27); Attempt 5/24

## 2024-05-25 VITALS
HEART RATE: 72 BPM | TEMPERATURE: 99.3 F | RESPIRATION RATE: 18 BRPM | OXYGEN SATURATION: 98 % | HEIGHT: 66 IN | WEIGHT: 100.53 LBS | DIASTOLIC BLOOD PRESSURE: 70 MMHG | SYSTOLIC BLOOD PRESSURE: 129 MMHG | BODY MASS INDEX: 16.16 KG/M2

## 2024-05-25 LAB
ALBUMIN SERPL BCP-MCNC: 3.3 G/DL (ref 3.2–4.9)
ALBUMIN/GLOB SERPL: 1.4 G/DL
ALP SERPL-CCNC: 61 U/L (ref 30–99)
ALT SERPL-CCNC: 9 U/L (ref 2–50)
ANION GAP SERPL CALC-SCNC: 10 MMOL/L (ref 7–16)
AST SERPL-CCNC: 16 U/L (ref 12–45)
BACTERIA BLD CULT: NORMAL
BACTERIA BLD CULT: NORMAL
BASOPHILS # BLD AUTO: 0.8 % (ref 0–1.8)
BASOPHILS # BLD: 0.08 K/UL (ref 0–0.12)
BILIRUB SERPL-MCNC: 0.2 MG/DL (ref 0.1–1.5)
BUN SERPL-MCNC: 19 MG/DL (ref 8–22)
CALCIUM ALBUM COR SERPL-MCNC: 8.7 MG/DL (ref 8.5–10.5)
CALCIUM SERPL-MCNC: 8.1 MG/DL (ref 8.5–10.5)
CHLORIDE SERPL-SCNC: 107 MMOL/L (ref 96–112)
CO2 SERPL-SCNC: 23 MMOL/L (ref 20–33)
CREAT SERPL-MCNC: 0.95 MG/DL (ref 0.5–1.4)
EOSINOPHIL # BLD AUTO: 0.88 K/UL (ref 0–0.51)
EOSINOPHIL NFR BLD: 9.3 % (ref 0–6.9)
ERYTHROCYTE [DISTWIDTH] IN BLOOD BY AUTOMATED COUNT: 46.4 FL (ref 35.9–50)
GFR SERPLBLD CREATININE-BSD FMLA CKD-EPI: 81 ML/MIN/1.73 M 2
GLOBULIN SER CALC-MCNC: 2.3 G/DL (ref 1.9–3.5)
GLUCOSE SERPL-MCNC: 106 MG/DL (ref 65–99)
HCT VFR BLD AUTO: 35.7 % (ref 42–52)
HGB BLD-MCNC: 11.6 G/DL (ref 14–18)
IMM GRANULOCYTES # BLD AUTO: 0.04 K/UL (ref 0–0.11)
IMM GRANULOCYTES NFR BLD AUTO: 0.4 % (ref 0–0.9)
LYMPHOCYTES # BLD AUTO: 2.11 K/UL (ref 1–4.8)
LYMPHOCYTES NFR BLD: 22.2 % (ref 22–41)
MAGNESIUM SERPL-MCNC: 1.9 MG/DL (ref 1.5–2.5)
MCH RBC QN AUTO: 31.4 PG (ref 27–33)
MCHC RBC AUTO-ENTMCNC: 32.5 G/DL (ref 32.3–36.5)
MCV RBC AUTO: 96.5 FL (ref 81.4–97.8)
MONOCYTES # BLD AUTO: 1.05 K/UL (ref 0–0.85)
MONOCYTES NFR BLD AUTO: 11 % (ref 0–13.4)
NEUTROPHILS # BLD AUTO: 5.35 K/UL (ref 1.82–7.42)
NEUTROPHILS NFR BLD: 56.3 % (ref 44–72)
NRBC # BLD AUTO: 0 K/UL
NRBC BLD-RTO: 0 /100 WBC (ref 0–0.2)
PLATELET # BLD AUTO: 234 K/UL (ref 164–446)
PMV BLD AUTO: 11.2 FL (ref 9–12.9)
POTASSIUM SERPL-SCNC: 4 MMOL/L (ref 3.6–5.5)
PROT SERPL-MCNC: 5.6 G/DL (ref 6–8.2)
RBC # BLD AUTO: 3.7 M/UL (ref 4.7–6.1)
SIGNIFICANT IND 70042: NORMAL
SIGNIFICANT IND 70042: NORMAL
SITE SITE: NORMAL
SITE SITE: NORMAL
SODIUM SERPL-SCNC: 140 MMOL/L (ref 135–145)
SOURCE SOURCE: NORMAL
SOURCE SOURCE: NORMAL
WBC # BLD AUTO: 9.5 K/UL (ref 4.8–10.8)

## 2024-05-25 PROCEDURE — 700111 HCHG RX REV CODE 636 W/ 250 OVERRIDE (IP): Performed by: STUDENT IN AN ORGANIZED HEALTH CARE EDUCATION/TRAINING PROGRAM

## 2024-05-25 PROCEDURE — A9270 NON-COVERED ITEM OR SERVICE: HCPCS

## 2024-05-25 PROCEDURE — 36415 COLL VENOUS BLD VENIPUNCTURE: CPT

## 2024-05-25 PROCEDURE — 99232 SBSQ HOSP IP/OBS MODERATE 35: CPT | Performed by: NURSE PRACTITIONER

## 2024-05-25 PROCEDURE — 85025 COMPLETE CBC W/AUTO DIFF WBC: CPT

## 2024-05-25 PROCEDURE — 700102 HCHG RX REV CODE 250 W/ 637 OVERRIDE(OP)

## 2024-05-25 PROCEDURE — 83735 ASSAY OF MAGNESIUM: CPT

## 2024-05-25 PROCEDURE — 99239 HOSP IP/OBS DSCHRG MGMT >30: CPT | Mod: GC | Performed by: INTERNAL MEDICINE

## 2024-05-25 PROCEDURE — 80053 COMPREHEN METABOLIC PANEL: CPT

## 2024-05-25 PROCEDURE — C9113 INJ PANTOPRAZOLE SODIUM, VIA: HCPCS | Performed by: STUDENT IN AN ORGANIZED HEALTH CARE EDUCATION/TRAINING PROGRAM

## 2024-05-25 RX ADMIN — PANTOPRAZOLE SODIUM 40 MG: 40 INJECTION, POWDER, FOR SOLUTION INTRAVENOUS at 06:07

## 2024-05-25 RX ADMIN — CYANOCOBALAMIN TAB 500 MCG 1000 MCG: 500 TAB at 06:07

## 2024-05-25 RX ADMIN — AMOXICILLIN AND CLAVULANATE POTASSIUM 1 TABLET: 875; 125 TABLET, FILM COATED ORAL at 06:07

## 2024-05-25 ASSESSMENT — ENCOUNTER SYMPTOMS
DEPRESSION: 0
DIARRHEA: 0
COUGH: 0
CONSTIPATION: 0
FEVER: 0
ABDOMINAL PAIN: 0
BLURRED VISION: 0
HEARTBURN: 0
VOMITING: 0
BACK PAIN: 1
CHILLS: 0
NAUSEA: 0
WEAKNESS: 1
BLOOD IN STOOL: 0
DIZZINESS: 0
SHORTNESS OF BREATH: 0

## 2024-05-25 ASSESSMENT — COGNITIVE AND FUNCTIONAL STATUS - GENERAL
SUGGESTED CMS G CODE MODIFIER MOBILITY: CI
MOBILITY SCORE: 23
PERSONAL GROOMING: A LITTLE
CLIMB 3 TO 5 STEPS WITH RAILING: A LITTLE
SUGGESTED CMS G CODE MODIFIER DAILY ACTIVITY: CJ
DRESSING REGULAR LOWER BODY CLOTHING: A LITTLE
DAILY ACTIVITIY SCORE: 20
TOILETING: A LITTLE
HELP NEEDED FOR BATHING: A LITTLE

## 2024-05-25 NOTE — PROGRESS NOTES
..Gastroenterology Progress Note               Author:  Jessie Byrd, DNP,  APRN Date & Time Created: 5/25/2024 7:13 AM       Patient ID:  Name:             Indio Cohen  YOB: 1945  Age:                 78 y.o.  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  Patient has been having this process going on for 3 days.  No bloody bowel movements overnight.  No fevers.  No sick contacts.  This is never happened before.  Patient has CT scan demonstrating colitis in the rectosigmoid region.  Patient denies history of colonoscopy.  No new medications.  Patient has not been on antibiotics recently.  Patient denies history of colonoscopy.  Patient has a history of AAA repair.  He has an endograft in place which appears stable.  Patient denies unintentional weight loss.  No hematemesis.  He is nauseous and has problems with vomiting.    5/24/2024: Flexible sigmoidoscopy reveals probable ischemic colitis, biopsies pending    Interval History:  5/22/2024: patient seen. Feels weak, tired, and reports all over pain. Per RN had 2 BM overnight however patient removed hat so sample was not taken. Discussed with patient that we need a sample. Hgb stable    5/23/2024: patient seen. Still complaining of weakness and back pain. No BM, Hgb 12, VSS    5/25/2024: Patient seen with family at bedside.  Feels better, ready to go home.Biopsies pending, labs reviewed, completed [facility]  "Medications:  Current Facility-Administered Medications   Medication Dose Frequency Provider Last Rate Last Admin    amoxicillin-clavulanate (Augmentin) 875-125 MG per tablet 1 Tablet  1 Tablet Q12HRS Linda Novak M.D.   1 Tablet at 05/25/24 0607    ondansetron (Zofran) syringe/vial injection 4 mg  4 mg Once PRN Jessie Byrd, DNP,  APRN        [START ON 5/28/2024] alendronate (Fosamax) tablet 70 mg  70 mg Q7 DAYS Linda Novak M.D.        cyanocobalamin (Vitamin B-12) tablet 1,000 mcg  1,000 mcg DAILY Linda Novak M.D.   1,000 mcg at 05/25/24 0607    donepezil (Aricept) tablet 5 mg  5 mg Nightly Linda Novak M.D.   5 mg at 05/24/24 2216    doxepin (SINEquan) capsule 10 mg  10 mg QHS PRN Linda Novak M.D.   10 mg at 05/24/24 2308    pantoprazole (Protonix) injection 40 mg  40 mg BID Mario Frost M.D.   40 mg at 05/25/24 0607    acetaminophen (Tylenol) tablet 650 mg  650 mg Q6HRS PRN Mario Frost M.D.   650 mg at 05/24/24 2218   Last reviewed on 5/21/2024  9:22 AM by Ora Silver, Pharmacy Intern       Review of Systems:  Review of Systems   Constitutional:  Positive for malaise/fatigue. Negative for chills and fever.   HENT:  Negative for hearing loss.    Eyes:  Negative for blurred vision.   Respiratory:  Negative for cough and shortness of breath.    Cardiovascular:  Negative for chest pain and leg swelling.   Gastrointestinal:  Negative for abdominal pain, blood in stool, constipation, diarrhea, heartburn, melena, nausea and vomiting.   Genitourinary:  Negative for dysuria.   Musculoskeletal:  Positive for back pain.   Skin:  Negative for rash.   Neurological:  Positive for weakness. Negative for dizziness.   Psychiatric/Behavioral:  Negative for depression.    All other systems reviewed and are negative.        Vital signs:  Weight/BMI: Body mass index is 16.23 kg/m².  /59   Pulse 70   Temp 36.6 °C (97.9 °F) (Temporal)   Resp 16   Ht 1.676 m (5' 6\")   Wt 45.6 kg (100 lb " 8.5 oz)   SpO2 98%   Vitals:    05/24/24 1810 05/24/24 1910 05/24/24 2310 05/25/24 0428   BP: 116/56 125/72 128/67 105/59   Pulse: 72 71 75 70   Resp: 18 18 18 16   Temp: 36.9 °C (98.4 °F) 36.6 °C (97.9 °F) 37 °C (98.6 °F) 36.6 °C (97.9 °F)   TempSrc: Temporal Temporal Temporal Temporal   SpO2: 96% 99% 95% 98%   Weight:       Height:         Oxygen Therapy:  Pulse Oximetry: 98 %, O2 (LPM): 0, O2 Delivery Device: None - Room Air    Intake/Output Summary (Last 24 hours) at 5/25/2024 0713  Last data filed at 5/25/2024 0621  Gross per 24 hour   Intake 2680 ml   Output 0 ml   Net 2680 ml         Physical Exam:  Physical Exam  Vitals and nursing note reviewed.   Constitutional:       General: He is not in acute distress.     Appearance: He is ill-appearing.      Comments: Frail thin elderly male   HENT:      Head: Normocephalic and atraumatic.      Right Ear: External ear normal.      Left Ear: External ear normal.      Nose: Nose normal.      Mouth/Throat:      Mouth: Mucous membranes are moist.      Pharynx: Oropharynx is clear.      Comments: Poor dentition  Eyes:      General: No scleral icterus.  Cardiovascular:      Rate and Rhythm: Normal rate and regular rhythm.      Pulses: Normal pulses.      Heart sounds: Normal heart sounds.   Pulmonary:      Effort: Pulmonary effort is normal.      Breath sounds: Rales present.   Abdominal:      General: Abdomen is flat. Bowel sounds are normal. There is no distension.      Palpations: Abdomen is soft.      Tenderness: There is no abdominal tenderness.   Musculoskeletal:         General: Normal range of motion.      Cervical back: Normal range of motion and neck supple.   Skin:     General: Skin is warm and dry.      Capillary Refill: Capillary refill takes less than 2 seconds.      Coloration: Skin is pale.   Neurological:      Mental Status: He is alert and oriented to person, place, and time.      Motor: Weakness present.   Psychiatric:         Mood and Affect: Mood  normal.         Behavior: Behavior normal.             Labs:  Recent Labs     05/23/24  0129 05/24/24 0247 05/25/24 0228   SODIUM 140 138 140   POTASSIUM 3.9 5.0 4.0   CHLORIDE 107 105 107   CO2 22 23 23   BUN 8 19 19   CREATININE 0.75 0.93 0.95   MAGNESIUM 2.0 2.0 1.9   CALCIUM 8.4* 8.5 8.1*     Recent Labs     05/23/24  0129 05/24/24 0247 05/25/24 0228   ALTSGPT 8 7 9   ASTSGOT 15 15 16   ALKPHOSPHAT 60 63 61   TBILIRUBIN 0.4 0.2 0.2   GLUCOSE 86 92 106*     Recent Labs     05/23/24  0129 05/24/24 0247 05/25/24 0228   WBC 7.5 9.0 9.5   NEUTSPOLYS 53.60 55.90 56.30   LYMPHOCYTES 26.60 24.20 22.20   MONOCYTES 11.30 12.10 11.00   EOSINOPHILS 7.20* 6.50 9.30*   BASOPHILS 0.90 1.00 0.80   ASTSGOT 15 15 16   ALTSGPT 8 7 9   ALKPHOSPHAT 60 63 61   TBILIRUBIN 0.4 0.2 0.2     Recent Labs     05/23/24  0129 05/23/24  1411 05/24/24 0247 05/25/24 0228   RBC 3.83*  --  3.95* 3.70*   HEMOGLOBIN 12.0*  --  12.6* 11.6*   HEMATOCRIT 36.0*  --  38.0* 35.7*   PLATELETCT 212  --  240 234   IRON 50  --   --   --    FERRITIN  --  183.0  --   --    TOTIRONBC 228*  --   --   --      Recent Results (from the past 24 hour(s))   Histology Request    Collection Time: 05/24/24  9:30 AM   Result Value Ref Range    Pathology Request Sent to Histo    EC-ECHOCARDIOGRAM COMPLETE W/O CONT    Collection Time: 05/24/24  9:55 AM   Result Value Ref Range    Left Ventrical Ejection Fraction 60    CBC WITH DIFFERENTIAL    Collection Time: 05/25/24  2:28 AM   Result Value Ref Range    WBC 9.5 4.8 - 10.8 K/uL    RBC 3.70 (L) 4.70 - 6.10 M/uL    Hemoglobin 11.6 (L) 14.0 - 18.0 g/dL    Hematocrit 35.7 (L) 42.0 - 52.0 %    MCV 96.5 81.4 - 97.8 fL    MCH 31.4 27.0 - 33.0 pg    MCHC 32.5 32.3 - 36.5 g/dL    RDW 46.4 35.9 - 50.0 fL    Platelet Count 234 164 - 446 K/uL    MPV 11.2 9.0 - 12.9 fL    Neutrophils-Polys 56.30 44.00 - 72.00 %    Lymphocytes 22.20 22.00 - 41.00 %    Monocytes 11.00 0.00 - 13.40 %    Eosinophils 9.30 (H) 0.00 - 6.90 %     Basophils 0.80 0.00 - 1.80 %    Immature Granulocytes 0.40 0.00 - 0.90 %    Nucleated RBC 0.00 0.00 - 0.20 /100 WBC    Neutrophils (Absolute) 5.35 1.82 - 7.42 K/uL    Lymphs (Absolute) 2.11 1.00 - 4.80 K/uL    Monos (Absolute) 1.05 (H) 0.00 - 0.85 K/uL    Eos (Absolute) 0.88 (H) 0.00 - 0.51 K/uL    Baso (Absolute) 0.08 0.00 - 0.12 K/uL    Immature Granulocytes (abs) 0.04 0.00 - 0.11 K/uL    NRBC (Absolute) 0.00 K/uL   Comp Metabolic Panel    Collection Time: 05/25/24  2:28 AM   Result Value Ref Range    Sodium 140 135 - 145 mmol/L    Potassium 4.0 3.6 - 5.5 mmol/L    Chloride 107 96 - 112 mmol/L    Co2 23 20 - 33 mmol/L    Anion Gap 10.0 7.0 - 16.0    Glucose 106 (H) 65 - 99 mg/dL    Bun 19 8 - 22 mg/dL    Creatinine 0.95 0.50 - 1.40 mg/dL    Calcium 8.1 (L) 8.5 - 10.5 mg/dL    Correct Calcium 8.7 8.5 - 10.5 mg/dL    AST(SGOT) 16 12 - 45 U/L    ALT(SGPT) 9 2 - 50 U/L    Alkaline Phosphatase 61 30 - 99 U/L    Total Bilirubin 0.2 0.1 - 1.5 mg/dL    Albumin 3.3 3.2 - 4.9 g/dL    Total Protein 5.6 (L) 6.0 - 8.2 g/dL    Globulin 2.3 1.9 - 3.5 g/dL    A-G Ratio 1.4 g/dL   MAGNESIUM    Collection Time: 05/25/24  2:28 AM   Result Value Ref Range    Magnesium 1.9 1.5 - 2.5 mg/dL   ESTIMATED GFR    Collection Time: 05/25/24  2:28 AM   Result Value Ref Range    GFR (CKD-EPI) 81 >60 mL/min/1.73 m 2       Radiology Review:  EC-ECHOCARDIOGRAM COMPLETE W/O CONT   Final Result      CT-CTA COMPLETE THORACOABDOMINAL AORTA   Final Result         1.  4.1 cm infrarenal abdominal aortic aneurysm with stent graft in place.   2.  4.4 x 4.0 cm fusiform distal thoracic aortic aneurysm, radiographic follow-up and surveillance recommended as clinically appropriate.   3.  Emphysema   4.  Cholelithiasis   5.  Atherosclerosis and atherosclerotic coronary artery disease      OUTSIDE IMAGES-CT CHEST   Final Result      OUTSIDE IMAGES-CT ABDOMEN /PELVIS   Final Result        MDM (Data Review):   -Records reviewed and summarized in current  documentation  -I personally reviewed and interpreted the laboratory results  -I personally reviewed the radiology images    Assessment/Recommendations:  Hematochezia  Proctocolitis  Vasculopathy with aortic stent graft  ?ischemic vs infectious colitis vs IBD  Normocytic anemia    Recommendations:  -Vitamin D replacement  -GI team will follow-up pathology  -Low fiber, lactose-free diet for 2 weeks    Discharging today    Discussed with patient, his family, primary team, Dr. Spain    Core Quality Measures   Reviewed items::  Labs, Medications and Radiology reports reviewed

## 2024-05-25 NOTE — CARE PLAN
The patient is Stable - Low risk of patient condition declining or worsening    Shift Goals  Clinical Goals: monitor labs, safety, PO abx  Patient Goals: sleep and get more coffee  Family Goals: sergio    Progress made toward(s) clinical / shift goals:     Problem: Hemodynamics  Goal: Patient's hemodynamics, fluid balance and neurologic status will be stable or improve  Outcome: Progressing  Patient educated on POC. Pt will remain NAEO.  Monitor vital signs (q8h/or as needed)  Hemodynamic monitoring (CBC, CMP, Mg)  Monitored intake and output q4h through the shift  Peripheral pulses and capillary refill were assessed  Color and body temperature were assessed  Positioned patient for maximum circulation/cardiac output  Signs/symptoms of excessive bleeding were monitored  Assessed mental status, restlessness and changes in level of consciousness  Monitored temperature (report fever or hypothermia to provider immediately).       Problem: Nutrition  Goal: Patient's nutritional and fluid intake will be adequate or improve  Outcome: Progressing  Pt dinner intake was 100%.  Encourage pt to drink more water instead of coffee.     Problem: Gastrointestinal Irritability  Goal: Nausea and vomiting will be absent or improve  Outcome: Progressing  No reported n/v.     Problem: Fall Risk  Goal: Patient will remain free from falls  Outcome: Progressing  Fall Prevention Protocol  To be followed on all patients at risk for fall  Patient Name: Indio POTTSShoshana    Guidelines for patients at risk to fall       Environmental precautions in use:       treaded slipper socks are on patient       Bed is locked and in lowest position       Personal belongings, wastebasket, call bell, urinal are in easy reach       Transferred patient toward stronger side       Report is given to CNA regarding patient's fall risk    The following are considered:        The side rail closest to bathroom is down     Bed rails:  [facility] Fall program emphasizes bed  rail reduction.  Bed rails contribute to patient fall risk by creating barriers to patient transfer in and out of beds.  Use of bed rails must be assessed specifically to individual patient needs.  When possible, use alternative pillows and positioning devices to avail the use of bed rails.  (Remember:  Four (4) side rails are considered a restraint).    Guidelines for high fall risk patients  Fall risk guidelines as outlined above      Fall risk armband on patient      Fall risk sign is placed by the door - High Fall Risk      Frequent toileting offered     Consider the following:      Patient room is close to the nurse's station      Bed alarm on      Physical Therapy/Occupational Therapy consultation for strengthening and mobility, including assessment of home care needs

## 2024-05-25 NOTE — PROGRESS NOTES
Patient discharged per MD order. Discharge paperwork discussed with patient, family, and another RN. IV removed. Patient sent with all belongings. Family at bedside. Wheeled down with staff. Son pulling car around to [facility] parking garage.

## 2024-05-25 NOTE — DISCHARGE SUMMARY
[facility] Internal Medicine Discharge Summary    Attending: Dr. Edmonds  Senior Resident: Dr. Izaguirre  Intern:  Dr. Novak  Contact Number: [phone]    CHIEF COMPLAINT ON ADMISSION  No chief complaint on file.      Reason for Admission  rectal bleeding, ischemic colon     Admission Date  5/20/2024    CODE STATUS  Prior    HPI & HOSPITAL COURSE  Indio Cohen is a 78 y.o. male who presented on 5/20/2024 as a transfer from outside facility.  Patient initially presented to outside facility complaining of abdominal pain and bright red blood per rectum.  Stated that the pain and bloody bowel movements were going on for 2 to 3 days prior admission.  Denied any fevers, chills, nausea, vomiting.  He reported that the pain was worse in the left upper quadrant. CT angiography of the abdomen pelvis with IV contrast was obtained.  There was severe wall thickening in the sigmoid colon and rectum consistent with colitis and proctitis. This could be due to infectious versus inflammatory versus ischemic etiology.  Inferior mesenteric artery was not clearly seen. There is a 4.1 cm abdominal aortic aneurysm with stent graft present that appears to be stable compared to December 2023. There is a patch uptake of contrast in the posterior aspect of the left kidney extending into the upper pole of the left kidney.  This could be due to ischemia or partial infarction approximately 20% of the left kidney. CT scan of the chest shows moderate emphysematous changes.  Coronary artery disease.  There is moderate size hiatal hernia.  4.8 mm nodule at the left lung base. Labs were notable for leukocytosis greater than 17,000.  Hemoglobin appears to be stable at 14.  He had a creatinine level of 1.32, which appears to be near his baseline.  Lactic acid was 1.3.  Urinalysis unremarkable. Case was discussed with both general surgery and vascular surgery.  No recommended surgical intervention at this time.  GI consulted: Ordered stool culture, C.  difficile and start full liquid diet. If stool studies were negative they were to consider flexible sigmoidoscopy for biopsies versus full colonoscopy based on patient's clinical course.  Patient did not had any bowel movement or episodes of diarrhea.  GI proceed with flexible sigmoidoscopy with biopsies on 5/24.  On day of discharge patient is clinically and hemodynamically stable.  Plan to discharge him with vitamin D replacement, low fiber, lactose-free diet for 2 weeks per GI recommendations.  Recommended to follow-up with primary care provider regarding recent admission and follow-up with GI for pathology results from biopsies.  Follow-up with physical therapy sessions outpatient.  Patient and family in agreement with plan.  Will discharge him home.        Therefore, he is discharged in fair and stable condition to home with close outpatient follow-up.    The patient met 2-midnight criteria for an inpatient stay at the time of discharge.    Discharge Date  5/25/2024    Physical Exam on Day of Discharge  Physical Exam  Constitutional:       General: He is not in acute distress.     Appearance: Normal appearance. He is not ill-appearing.      Comments: Cachectic   HENT:      Head: Normocephalic and atraumatic.      Nose: Nose normal.      Mouth/Throat:      Mouth: Mucous membranes are moist.   Eyes:      Extraocular Movements: Extraocular movements intact.      Conjunctiva/sclera: Conjunctivae normal.      Pupils: Pupils are equal, round, and reactive to light.   Cardiovascular:      Rate and Rhythm: Normal rate and regular rhythm.      Pulses: Normal pulses.      Heart sounds: Normal heart sounds.   Pulmonary:      Effort: Pulmonary effort is normal. No respiratory distress.      Breath sounds: Normal breath sounds.   Chest:      Chest wall: No tenderness.   Abdominal:      General: Bowel sounds are normal. There is no distension.      Palpations: Abdomen is soft.      Tenderness: There is no abdominal  tenderness. There is no right CVA tenderness or left CVA tenderness.   Musculoskeletal:         General: No swelling or tenderness. Normal range of motion.      Cervical back: Normal range of motion and neck supple.      Right lower leg: No edema.      Left lower leg: No edema.   Skin:     General: Skin is warm.      Capillary Refill: Capillary refill takes less than 2 seconds.      Coloration: Skin is not jaundiced or pale.   Neurological:      General: No focal deficit present.      Mental Status: He is alert and oriented to person, place, and time.      Cranial Nerves: No cranial nerve deficit.      Sensory: No sensory deficit.      Motor: No weakness.   Psychiatric:         Mood and Affect: Mood normal.         FOLLOW UP ITEMS POST DISCHARGE  -Follow-up with primary care provider regarding recent admission.  -Follow-up with physical therapy for extra sessions outpatient.  -Follow-up with gastroenterology for pathology results of biopsy.    DISCHARGE DIAGNOSES  Principal Problem:    Ischemic bowel disease (HCC) (POA: Yes)  Active Problems:    AAA (abdominal aortic aneurysm) (HCC) (POA: Yes)    Smoking (POA: Yes)    Severe protein-calorie malnutrition (HCC) (POA: Unknown)    GI bleed (POA: Unknown)    Renal infarct (HCC) (POA: Unknown)    Advanced care planning/counseling discussion (POA: Unknown)  Resolved Problems:    * No resolved hospital problems. *      FOLLOW UP  No future appointments.  No follow-up provider specified.    MEDICATIONS ON DISCHARGE     Medication List        CONTINUE taking these medications        Instructions   albuterol 108 (90 Base) MCG/ACT Aers inhalation aerosol   Inhale 1 Puff every four hours as needed for Shortness of Breath.  Dose: 1 Puff     alendronate 70 MG Tabs  Commonly known as: Fosamax   Take 70 mg by mouth every 7 days.  Dose: 70 mg     amLODIPine 5 MG Tabs  Commonly known as: Norvasc   Take 5 mg by mouth every day.  Dose: 5 mg     donepezil 5 MG Tabs  Commonly known as:  Aricept   Take 5 mg by mouth every evening.  Dose: 5 mg     doxepin 10 MG Caps  Commonly known as: SINEquan   Take 10 mg by mouth at bedtime as needed (insomnia).  Dose: 10 mg     HYDROcodone/acetaminophen  MG Tabs  Commonly known as: Norco   Take 1 Tablet by mouth 3 times a day. Indications: Moderate to Moderately Severe Pain  Dose: 1 Tablet     lisinopril 40 MG tablet  Commonly known as: Prinivil   Take 40 mg by mouth every day.  Dose: 40 mg     metoprolol SR 25 MG Tb24  Commonly known as: Toprol XL   Take 25 mg by mouth every day.  Dose: 25 mg     omeprazole 20 MG delayed-release capsule  Commonly known as: PriLOSEC   Take 20 mg by mouth 2 times a day. Indications: Gastroesophageal Reflux Disease with Current Symptoms  Dose: 20 mg     vitamin B-12 1000 MCG Tabs   Take 1,000 mcg by mouth every day.  Dose: 1,000 mcg            STOP taking these medications      ibuprofen 800 MG Tabs  Commonly known as: Motrin              Allergies  Allergies   Allergen Reactions    Codeine Vomiting     n/v       DIET  No orders of the defined types were placed in this encounter.      ACTIVITY  As tolerated.  Weight bearing as tolerated    CONSULTATIONS  GI  General surgery  Vascular surgery    PROCEDURES  Flexible Sigmoidoscopy with biopsies on 5/24    LABORATORY  Lab Results   Component Value Date    SODIUM 140 05/25/2024    POTASSIUM 4.0 05/25/2024    CHLORIDE 107 05/25/2024    CO2 23 05/25/2024    GLUCOSE 106 (H) 05/25/2024    BUN 19 05/25/2024    CREATININE 0.95 05/25/2024        Lab Results   Component Value Date    WBC 9.5 05/25/2024    HEMOGLOBIN 11.6 (L) 05/25/2024    HEMATOCRIT 35.7 (L) 05/25/2024    PLATELETCT 234 05/25/2024        Total time of the discharge process exceeds 55 minutes.

## 2024-05-25 NOTE — DISCHARGE INSTRUCTIONS
-Follow-up with primary care provider regarding recent admission.  -Follow-up with physical therapy for extra sessions outpatient.  -Follow-up with gastroenterology for pathology results of biopsy.  -Take medications as prescribed.

## (undated) DEVICE — LACTATED RINGERS INJ 1000 ML - (14EA/CA 60CA/PF)

## (undated) DEVICE — SODIUM CHL IRRIGATION 0.9% 1000ML (12EA/CA)

## (undated) DEVICE — KIT CUSTOM PROCEDURE SINGLE FOR ENDO (15/CA)

## (undated) DEVICE — FORCEP RADIAL JAW 4 STANDARD CAPACITY W/NEEDLE 240CM (40EA/BX)

## (undated) DEVICE — ELECTRODE 850 FOAM ADHESIVE - HYDROGEL RADIOTRNSPRNT (50/PK)

## (undated) DEVICE — MASK OXYGEN VNYL ADLT MED CONC WITH 7 FOOT TUBING - (50EA/CA)

## (undated) DEVICE — BUTTON ENDOSCOPY DISPOSABLE

## (undated) DEVICE — TUBE CONNECTING SUCTION - CLEAR PLASTIC STERILE 72 IN (50EA/CA)

## (undated) DEVICE — SET EXTENSION WITH 2 PORTS (48EA/CA) ***PART #2C8610 IS A SUBSTITUTE*****

## (undated) DEVICE — CANISTER SUCTION RIGID RED 1500CC (40EA/CA)

## (undated) DEVICE — WATER IRRIGATION STERILE 1000ML (12EA/CA)

## (undated) DEVICE — SET LEADWIRE 5 LEAD BEDSIDE DISPOSABLE ECG (1SET OF 5/EA)

## (undated) DEVICE — FILM CASSETTE ENDO

## (undated) DEVICE — CONTAINER, SPECIMEN, STERILE

## (undated) DEVICE — PORT AUXILLARY WATER (50EA/BX)

## (undated) DEVICE — NEPTUNE 4 PORT MANIFOLD - (20/PK)

## (undated) DEVICE — TUBING CLEARLINK DUO-VENT - C-FLO (48EA/CA)

## (undated) DEVICE — SENSOR OXIMETER ADULT SPO2 RD SET (20EA/BX)